# Patient Record
Sex: MALE | Race: OTHER | Employment: OTHER | URBAN - NONMETROPOLITAN AREA
[De-identification: names, ages, dates, MRNs, and addresses within clinical notes are randomized per-mention and may not be internally consistent; named-entity substitution may affect disease eponyms.]

---

## 2017-08-25 ENCOUNTER — HOSPITAL ENCOUNTER (EMERGENCY)
Age: 67
Discharge: HOME | End: 2017-08-25
Attending: EMERGENCY MEDICINE
Payer: MEDICARE

## 2017-08-25 VITALS
HEIGHT: 69 IN | BODY MASS INDEX: 27.99 KG/M2 | DIASTOLIC BLOOD PRESSURE: 91 MMHG | OXYGEN SATURATION: 96 % | SYSTOLIC BLOOD PRESSURE: 151 MMHG | RESPIRATION RATE: 16 BRPM | HEART RATE: 75 BPM | TEMPERATURE: 96.9 F | WEIGHT: 189 LBS

## 2017-08-25 DIAGNOSIS — J01.00 ACUTE MAXILLARY SINUSITIS, RECURRENCE NOT SPECIFIED: Primary | ICD-10-CM

## 2017-08-25 PROCEDURE — 99282 EMERGENCY DEPT VISIT SF MDM: CPT

## 2017-08-25 RX ORDER — AMOXICILLIN 500 MG/1
500 CAPSULE ORAL 3 TIMES DAILY
Qty: 30 CAPSULE | Refills: 0 | Status: SHIPPED | OUTPATIENT
Start: 2017-08-25 | End: 2017-09-04

## 2017-08-25 RX ORDER — METHYLPREDNISOLONE 4 MG/1
TABLET ORAL
Qty: 1 KIT | Refills: 0 | Status: SHIPPED | OUTPATIENT
Start: 2017-08-25 | End: 2017-08-31

## 2017-08-25 RX ORDER — DILTIAZEM HYDROCHLORIDE 120 MG/1
120 TABLET, FILM COATED ORAL DAILY
COMMUNITY

## 2017-08-25 RX ORDER — FERROUS SULFATE 325(65) MG
325 TABLET ORAL 2 TIMES DAILY
COMMUNITY

## 2017-08-25 RX ORDER — HYDROCODONE BITARTRATE AND ACETAMINOPHEN 7.5; 325 MG/1; MG/1
1 TABLET ORAL EVERY 6 HOURS PRN
Qty: 12 TABLET | Refills: 0 | Status: SHIPPED | OUTPATIENT
Start: 2017-08-25 | End: 2017-09-01

## 2017-08-25 ASSESSMENT — PAIN DESCRIPTION - DESCRIPTORS: DESCRIPTORS: CONSTANT;THROBBING

## 2017-08-25 ASSESSMENT — PAIN DESCRIPTION - LOCATION: LOCATION: OTHER (COMMENT)

## 2017-08-25 ASSESSMENT — PAIN SCALES - GENERAL: PAINLEVEL_OUTOF10: 6

## 2017-08-25 ASSESSMENT — PAIN DESCRIPTION - PAIN TYPE: TYPE: ACUTE PAIN

## 2017-08-26 ASSESSMENT — ENCOUNTER SYMPTOMS
NAUSEA: 0
ABDOMINAL PAIN: 0
COUGH: 0
SORE THROAT: 1
SHORTNESS OF BREATH: 0
WHEEZING: 0

## 2020-04-19 ENCOUNTER — HOSPITAL ENCOUNTER (EMERGENCY)
Age: 70
Discharge: HOME | End: 2020-04-19
Attending: EMERGENCY MEDICINE
Payer: MEDICARE

## 2020-04-19 ENCOUNTER — APPOINTMENT (OUTPATIENT)
Dept: CT IMAGING | Age: 70
End: 2020-04-19
Payer: MEDICARE

## 2020-04-19 VITALS
HEIGHT: 69 IN | HEART RATE: 69 BPM | RESPIRATION RATE: 18 BRPM | WEIGHT: 200 LBS | OXYGEN SATURATION: 96 % | BODY MASS INDEX: 29.62 KG/M2 | SYSTOLIC BLOOD PRESSURE: 125 MMHG | DIASTOLIC BLOOD PRESSURE: 87 MMHG | TEMPERATURE: 98.8 F

## 2020-04-19 PROCEDURE — 6370000000 HC RX 637 (ALT 250 FOR IP): Performed by: EMERGENCY MEDICINE

## 2020-04-19 PROCEDURE — 70450 CT HEAD/BRAIN W/O DYE: CPT

## 2020-04-19 PROCEDURE — 99283 EMERGENCY DEPT VISIT LOW MDM: CPT

## 2020-04-19 RX ORDER — ACETAMINOPHEN 500 MG
1000 TABLET ORAL ONCE
Status: COMPLETED | OUTPATIENT
Start: 2020-04-19 | End: 2020-04-19

## 2020-04-19 RX ADMIN — ACETAMINOPHEN 1000 MG: 500 TABLET ORAL at 16:58

## 2020-04-19 ASSESSMENT — PAIN SCALES - GENERAL
PAINLEVEL_OUTOF10: 6
PAINLEVEL_OUTOF10: 4

## 2020-04-19 ASSESSMENT — ENCOUNTER SYMPTOMS
ABDOMINAL PAIN: 0
SORE THROAT: 0
WHEEZING: 0
BLOOD IN STOOL: 0
DIARRHEA: 0
SHORTNESS OF BREATH: 0

## 2020-04-19 ASSESSMENT — PAIN DESCRIPTION - LOCATION: LOCATION: HEAD

## 2020-04-19 NOTE — ED NOTES
Pt presents per self. Complains of 3 weeks sinus headaches. States had antibiotic and Flonase called in by primary care with no relief. States his headache develops about mid day. Takes a nap with some relief and it comes back in evening. Denies fever, cough or nasal congestion. States usually it goes away by now.        Kenroy Grissom RN  04/19/20 4827

## 2020-09-12 ENCOUNTER — HOSPITAL ENCOUNTER (EMERGENCY)
Age: 70
Discharge: HOME | End: 2020-09-12
Attending: FAMILY MEDICINE
Payer: MEDICARE

## 2020-09-12 ENCOUNTER — APPOINTMENT (OUTPATIENT)
Dept: GENERAL RADIOLOGY | Age: 70
End: 2020-09-12
Payer: MEDICARE

## 2020-09-12 VITALS
TEMPERATURE: 97.1 F | RESPIRATION RATE: 16 BRPM | DIASTOLIC BLOOD PRESSURE: 72 MMHG | HEART RATE: 86 BPM | SYSTOLIC BLOOD PRESSURE: 118 MMHG | OXYGEN SATURATION: 97 %

## 2020-09-12 PROCEDURE — 99283 EMERGENCY DEPT VISIT LOW MDM: CPT

## 2020-09-12 PROCEDURE — 73610 X-RAY EXAM OF ANKLE: CPT

## 2020-09-12 PROCEDURE — 99284 EMERGENCY DEPT VISIT MOD MDM: CPT

## 2020-09-12 RX ORDER — NAPROXEN 375 MG/1
375 TABLET ORAL 2 TIMES DAILY WITH MEALS
Qty: 30 TABLET | Refills: 5 | Status: SHIPPED | OUTPATIENT
Start: 2020-09-12 | End: 2022-08-08

## 2020-09-12 ASSESSMENT — ENCOUNTER SYMPTOMS
COUGH: 0
DIARRHEA: 0
BACK PAIN: 0
WHEEZING: 0
SHORTNESS OF BREATH: 0
SORE THROAT: 0
ABDOMINAL PAIN: 0
NAUSEA: 0
VOMITING: 0

## 2020-09-12 ASSESSMENT — PAIN DESCRIPTION - ORIENTATION: ORIENTATION: RIGHT

## 2020-09-12 ASSESSMENT — PAIN DESCRIPTION - PAIN TYPE: TYPE: ACUTE PAIN

## 2020-09-12 ASSESSMENT — PAIN SCALES - GENERAL: PAINLEVEL_OUTOF10: 3

## 2020-09-12 ASSESSMENT — PAIN DESCRIPTION - LOCATION: LOCATION: ANKLE

## 2020-09-12 NOTE — ED PROVIDER NOTES
[address]/[facility] COMPLAINT       Chief Complaint   Patient presents with    Ankle Pain     right       Nurses Notes reviewed and I agree except as noted in the HPI. HISTORY OF PRESENT ILLNESS    Juan Reagan is a 78 y.o. male who presents for evaluation of lateral right ankle pain. Patient was playing weeds just prior to arrival when he tripped over a rock and rolled his right ankle. There is some swelling to the lateral aspect. He rates his pain at a 3/10 severity. Pain does not radiate. He denies any numbness or tingling. He sustained no other injuries. Bearing weight worsens his pain. He took no medication prior to arrival.    REVIEW OF SYSTEMS     Review of Systems   Constitutional: Negative for activity change, appetite change, chills and fever. HENT: Negative for ear pain and sore throat. Respiratory: Negative for cough, shortness of breath and wheezing. Cardiovascular: Negative for chest pain and leg swelling. Gastrointestinal: Negative for abdominal pain, diarrhea, nausea and vomiting. Genitourinary: Negative for dysuria, flank pain and hematuria. Musculoskeletal: Positive for arthralgias and joint swelling. Negative for back pain, gait problem and neck pain. Skin: Negative for rash and wound. Neurological: Negative for weakness, light-headedness and headaches. Psychiatric/Behavioral: Negative for agitation and hallucinations. The patient is not nervous/anxious. PAST MEDICAL HISTORY    has a past medical history of Hypertension. SURGICAL HISTORY      has a past surgical history that includes Nerve Surgery. CURRENT MEDICATIONS       Previous Medications    BLOOD PRESSURE    by Does not apply route.  Patient takes medication for hyperstension     DILTIAZEM (CARDIZEM) 120 MG TABLET    Take 120 mg by mouth daily    FERROUS SULFATE 325 (65 FE) MG TABLET    Take 325 mg by mouth 2 times daily METOPROLOL TARTRATE (LOPRESSOR) 25 MG TABLET    Take 25 mg by mouth 2 times daily Patient unsure of the exact dosage. Thinks it may be 5mg    OMEGA-3 FATTY ACIDS (OMEGA 3 PO)    Take by mouth       ALLERGIES     has No Known Allergies. FAMILY HISTORY     has no family status information on file. family history is not on file. SOCIAL HISTORY      reports that he has quit smoking. He has never used smokeless tobacco. He reports current alcohol use. He reports that he does not use drugs. PHYSICAL EXAM     INITIAL VITALS:  temporal temperature is 97.1 °F (36.2 °C). His blood pressure is 118/72 and his pulse is 86. His respiration is 16 and oxygen saturation is 97%. Physical Exam  Vitals signs and nursing note reviewed. Constitutional:       General: He is not in acute distress. Appearance: He is not diaphoretic. Cardiovascular:      Rate and Rhythm: Normal rate and regular rhythm. Pulses: Normal pulses. Heart sounds: Normal heart sounds. Pulmonary:      Effort: Pulmonary effort is normal.      Breath sounds: Normal breath sounds. Abdominal:      General: Bowel sounds are normal. There is no distension. Palpations: Abdomen is soft. Tenderness: There is no abdominal tenderness. Musculoskeletal: Normal range of motion. General: Swelling (About the right lateral malleolus) and tenderness (Right lateral malleolus and anterior to it) present. Skin:     General: Skin is warm and dry. DIFFERENTIAL DIAGNOSIS:   Right ankle sprain, fracture, dislocation    DIAGNOSTIC RESULTS         RADIOLOGY: non-plain filmimages(s) such as CT, Ultrasound and MRI are read by the radiologist.  XR ANKLE RIGHT (MIN 3 VIEWS)   Final Result   Soft tissue swelling with no acute fracture or dislocation. **This report has been created using voice recognition software. It may contain minor errors which are inherent in voice recognition technology. **      Final report

## 2020-09-12 NOTE — FLOWSHEET NOTE
Air cast applied to right ankle. CNS intact prior to and after application. Pt pink warm and dry, breathing with ease. Pt remains alert and oriented. Prescription given and reviewed. AVS reviewed. Teach back method used. Denies questions or concerns. Pt discharged in stable condition.

## 2020-09-12 NOTE — ED TRIAGE NOTES
Pt presents with complaints of right ankle pain. Pt was spraying weeds and tripped over a rock and rolled his right ankle. Pt right ankle is swollen. Pt alert and oriented.

## 2022-08-08 ENCOUNTER — HOSPITAL ENCOUNTER (EMERGENCY)
Age: 72
Discharge: HOME | End: 2022-08-08
Attending: EMERGENCY MEDICINE
Payer: MEDICARE

## 2022-08-08 VITALS
HEIGHT: 69 IN | HEART RATE: 66 BPM | DIASTOLIC BLOOD PRESSURE: 68 MMHG | WEIGHT: 200 LBS | TEMPERATURE: 97.4 F | SYSTOLIC BLOOD PRESSURE: 108 MMHG | OXYGEN SATURATION: 96 % | RESPIRATION RATE: 18 BRPM | BODY MASS INDEX: 29.62 KG/M2

## 2022-08-08 DIAGNOSIS — J06.9 VIRAL URI: ICD-10-CM

## 2022-08-08 DIAGNOSIS — R09.81 NASAL SINUS CONGESTION: Primary | ICD-10-CM

## 2022-08-08 LAB — SARS-COV-2, NAAT: NOT  DETECTED

## 2022-08-08 PROCEDURE — 87635 SARS-COV-2 COVID-19 AMP PRB: CPT

## 2022-08-08 PROCEDURE — 99283 EMERGENCY DEPT VISIT LOW MDM: CPT

## 2022-08-08 RX ORDER — FLUTICASONE PROPIONATE 50 MCG
1 SPRAY, SUSPENSION (ML) NASAL DAILY
Qty: 16 G | Refills: 0 | Status: SHIPPED | OUTPATIENT
Start: 2022-08-08

## 2022-08-08 RX ORDER — CETIRIZINE HYDROCHLORIDE 10 MG/1
10 TABLET ORAL DAILY
Qty: 10 TABLET | Refills: 0 | Status: SHIPPED | OUTPATIENT
Start: 2022-08-08 | End: 2022-08-18

## 2022-08-08 ASSESSMENT — PAIN - FUNCTIONAL ASSESSMENT: PAIN_FUNCTIONAL_ASSESSMENT: NONE - DENIES PAIN

## 2022-08-08 NOTE — ED NOTES
Pt resting, resp even and unlabored, skin pink, warm and dry. Pt given discharge instructions and verbalizes understanding, pt released.      Alysia Hayes RN  08/08/22 4094

## 2022-08-08 NOTE — ED PROVIDER NOTES
[address]  [address] [place] [address] [address]  Phone: [phone]    eMERGENCY dEPARTMENT eNCOUnter           CHIEF COMPLAINT       Chief Complaint   Patient presents with    Sinusitis       Nurses Notes reviewed and I agree except as noted in the HPI. HISTORY OF PRESENT ILLNESS    Rainer Salter is a 66 y.o. male who presented via private vehicle with above-mentioned complaints. He presented with 2 days history of nasal congestion and headache. He describes his headache as mild persistent pressure over the central frontal head and nasal sinuses. He denies nasal discharge. He had a chronic sinus problem due to \"allergies\". He denies fever or chills. He denies nausea or vomiting. He denies difficulty breathing or swallowing. REVIEW OF SYSTEMS     Negative except as mentioned above    PAST MEDICAL HISTORY    has a past medical history of Hypertension. SURGICAL HISTORY      has a past surgical history that includes Nerve Surgery. CURRENT MEDICATIONS       Previous Medications    BLOOD PRESSURE    by Does not apply route. Patient takes medication for hyperstension     DILTIAZEM (CARDIZEM) 120 MG TABLET    Take 120 mg by mouth daily    FERROUS SULFATE 325 (65 FE) MG TABLET    Take 325 mg by mouth 2 times daily    METOPROLOL TARTRATE (LOPRESSOR) 25 MG TABLET    Take 25 mg by mouth 2 times daily Patient unsure of the exact dosage. Thinks it may be 5mg    OMEGA-3 FATTY ACIDS (OMEGA 3 PO)    Take by mouth       ALLERGIES     has No Known Allergies. FAMILY HISTORY     has no family status information on file. family history is not on file. SOCIAL HISTORY      reports that he has quit smoking. He has never used smokeless tobacco. He reports current alcohol use. He reports that he does not use drugs. PHYSICAL EXAM     INITIAL VITALS:  height is 5' 9\" (1.753 m) and weight is 200 lb (90.7 kg). His tympanic temperature is 97.4 °F (36.3 °C).  His blood pressure is 108/68 and his pulse is 66. His respiration is 18 and oxygen saturation is 96%. Physical Exam  Vitals and nursing note reviewed. Constitutional:       General: He is not in acute distress. Appearance: He is well-developed. He is not ill-appearing. HENT:      Head: Atraumatic. Comments: Mild nasal congestion, no pain with percussion of the frontal or maxillary sinuses. Eyes:      Conjunctiva/sclera: Conjunctivae normal.      Pupils: Pupils are equal, round, and reactive to light. Neck:      Thyroid: No thyromegaly. Vascular: No JVD. Trachea: No tracheal deviation. Cardiovascular:      Rate and Rhythm: Normal rate and regular rhythm. Heart sounds: No murmur heard. No friction rub. No gallop. Pulmonary:      Effort: Pulmonary effort is normal.      Breath sounds: Normal breath sounds. Musculoskeletal:         General: No tenderness. Cervical back: Neck supple. Neurological:      Mental Status: He is alert. DIFFERENTIAL DIAGNOSIS:       DIAGNOSTIC RESULTS         LABS:   Labs Reviewed   COVID-19, RAPID       EMERGENCY DEPARTMENT COURSE:   Vitals:    Vitals:    08/08/22 1257   BP: 108/68   Pulse: 66   Resp: 18   Temp: 97.4 °F (36.3 °C)   TempSrc: Tympanic   SpO2: 96%   Weight: 200 lb (90.7 kg)   Height: 5' 9\" (1.753 m)         FINAL IMPRESSION      1. Nasal sinus congestion    2. Viral URI          DISPOSITION/PLAN   Discharged  home in good condition. PATIENT REFERRED TO:  King Lozano MD  [address] [place] [address] [address] [id number] [phone]    In 2 days        DISCHARGE MEDICATIONS:  New Prescriptions    CETIRIZINE (ZYRTEC) 10 MG TABLET    Take 1 tablet by mouth in the morning for 10 days. FLUTICASONE (FLONASE) 50 MCG/ACT NASAL SPRAY    1 spray by Each Nostril route in the morning.        (Please note that portions of this note were completed with a voice recognition program.  Efforts were made to edit the dictations but occasionally words are mis-transcribed.)    MD Oksana Guerin MD  08/08/22 5525

## 2022-08-08 NOTE — ED NOTES
Pt complains of sinus pain, headache and irritated eyes. Pt denies fever, vomiting or diarrhea. Pt alert, resp even and unlabored, skin pink, warm and dry.      Irma Couch RN  08/08/22 2020

## 2022-08-19 ENCOUNTER — APPOINTMENT (OUTPATIENT)
Dept: GENERAL RADIOLOGY | Age: 72
End: 2022-08-19
Payer: MEDICARE

## 2022-08-19 ENCOUNTER — HOSPITAL ENCOUNTER (EMERGENCY)
Age: 72
Discharge: HOME | End: 2022-08-19
Attending: FAMILY MEDICINE
Payer: MEDICARE

## 2022-08-19 VITALS
BODY MASS INDEX: 29.62 KG/M2 | WEIGHT: 200 LBS | DIASTOLIC BLOOD PRESSURE: 72 MMHG | HEART RATE: 61 BPM | TEMPERATURE: 97.3 F | SYSTOLIC BLOOD PRESSURE: 114 MMHG | RESPIRATION RATE: 16 BRPM | OXYGEN SATURATION: 95 % | HEIGHT: 69 IN

## 2022-08-19 DIAGNOSIS — D69.6 THROMBOCYTOPENIA (HCC): ICD-10-CM

## 2022-08-19 DIAGNOSIS — R06.09 DYSPNEA ON EXERTION: Primary | ICD-10-CM

## 2022-08-19 LAB
ALBUMIN SERPL-MCNC: 3.3 GM/DL (ref 3.4–5)
ALP BLD-CCNC: 113 U/L (ref 46–116)
ALT SERPL-CCNC: 38 U/L (ref 14–63)
ANION GAP: 9 MEQ/L (ref 8–16)
AST SERPL-CCNC: 43 U/L (ref 15–37)
BILIRUB SERPL-MCNC: 0.8 MG/DL (ref 0.2–1)
BUN BLDV-MCNC: 12 MG/DL (ref 7–18)
CHLORIDE BLD-SCNC: 105 MEQ/L (ref 98–107)
CO2: 23 MEQ/L (ref 21–32)
CREAT SERPL-MCNC: 1 MG/DL (ref 0.6–1.3)
DIFFERENTIAL, MANUAL: NORMAL
GFR, ESTIMATED: 78 ML/MIN/1.73M2
GLUCOSE BLD-MCNC: 126 MG/DL (ref 74–106)
MAGNESIUM: 1.7 MG/DL (ref 1.8–2.4)
NT PRO BNP: 91 PG/ML (ref 0–900)
POC CALCIUM: 8.5 MG/DL (ref 8.5–10.1)
POTASSIUM SERPL-SCNC: 3.8 MEQ/L (ref 3.5–5.1)
SODIUM BLD-SCNC: 137 MEQ/L (ref 136–145)
TOTAL PROTEIN: 7 GM/DL (ref 6.4–8.2)
TROPONIN, HIGH SENSITIVITY: 17.8 PG/ML (ref 0–76.1)

## 2022-08-19 PROCEDURE — 93005 ELECTROCARDIOGRAM TRACING: CPT | Performed by: FAMILY MEDICINE

## 2022-08-19 PROCEDURE — 80053 COMPREHEN METABOLIC PANEL: CPT

## 2022-08-19 PROCEDURE — 85025 COMPLETE CBC W/AUTO DIFF WBC: CPT

## 2022-08-19 PROCEDURE — 99285 EMERGENCY DEPT VISIT HI MDM: CPT

## 2022-08-19 PROCEDURE — 83880 ASSAY OF NATRIURETIC PEPTIDE: CPT

## 2022-08-19 PROCEDURE — 83735 ASSAY OF MAGNESIUM: CPT

## 2022-08-19 PROCEDURE — 71046 X-RAY EXAM CHEST 2 VIEWS: CPT

## 2022-08-19 PROCEDURE — 84484 ASSAY OF TROPONIN QUANT: CPT

## 2022-08-19 RX ORDER — CETIRIZINE HYDROCHLORIDE 10 MG/1
10 TABLET ORAL NIGHTLY
COMMUNITY
Start: 2022-08-08

## 2022-08-19 ASSESSMENT — ENCOUNTER SYMPTOMS
CONSTIPATION: 0
SHORTNESS OF BREATH: 1
WHEEZING: 0
DIARRHEA: 0
ABDOMINAL PAIN: 0
SORE THROAT: 0
VOMITING: 0
COLOR CHANGE: 0
COUGH: 0
NAUSEA: 0
BACK PAIN: 0

## 2022-08-19 ASSESSMENT — PAIN - FUNCTIONAL ASSESSMENT
PAIN_FUNCTIONAL_ASSESSMENT: NONE - DENIES PAIN

## 2022-08-19 NOTE — ED PROVIDER NOTES
surgical history that includes Nerve Surgery. CURRENT MEDICATIONS       Previous Medications    BLOOD PRESSURE    by Does not apply route. Patient takes medication for hyperstension     CETIRIZINE (ZYRTEC) 10 MG TABLET    Take 10 mg by mouth at bedtime    DILTIAZEM (CARDIZEM) 120 MG TABLET    Take 120 mg by mouth daily    FERROUS SULFATE 325 (65 FE) MG TABLET    Take 325 mg by mouth 2 times daily    FLUTICASONE (FLONASE) 50 MCG/ACT NASAL SPRAY    1 spray by Each Nostril route in the morning. METOPROLOL TARTRATE (LOPRESSOR) 25 MG TABLET    Take 25 mg by mouth 2 times daily Patient unsure of the exact dosage. Thinks it may be 5mg    OMEGA-3 FATTY ACIDS (OMEGA 3 PO)    Take by mouth       ALLERGIES     has No Known Allergies. FAMILY HISTORY     has no family status information on file. family history is not on file. SOCIAL HISTORY      reports that he has quit smoking. He has never used smokeless tobacco. He reports current alcohol use. He reports that he does not use drugs. PHYSICAL EXAM     INITIAL VITALS:  height is 5' 9\" (1.753 m) and weight is 200 lb (90.7 kg). His temporal temperature is 97.3 °F (36.3 °C). His blood pressure is 114/72 and his pulse is 61. His respiration is 16 and oxygen saturation is 95%. Physical Exam  Vitals and nursing note reviewed. Constitutional:       General: He is not in acute distress. Appearance: He is not diaphoretic. HENT:      Head: Normocephalic and atraumatic. Cardiovascular:      Rate and Rhythm: Normal rate and regular rhythm. Heart sounds: Normal heart sounds. Pulmonary:      Effort: Pulmonary effort is normal.      Breath sounds: Normal breath sounds. Abdominal:      General: Bowel sounds are normal. There is no distension. Palpations: Abdomen is soft. Tenderness: There is no abdominal tenderness. Musculoskeletal:         General: Normal range of motion. Right lower leg: Edema (+1 pitting) present.       Left lower leg: Edema (+1 pitting) present. Lymphadenopathy:      Cervical: No cervical adenopathy. Skin:     General: Skin is warm and dry. Neurological:      Mental Status: He is alert and oriented to person, place, and time. Psychiatric:         Mood and Affect: Mood normal.         Behavior: Behavior normal.       DIFFERENTIAL DIAGNOSIS:   CHF, pneumonia, ACS,     DIAGNOSTIC RESULTS     EKG: All EKG's are interpreted by the Emergency Department Physician whoeither signs or Co-signs this chart in the absence of a cardiologist.  Normal sinus rhythm with heart rate 66. AL interval 196. QRS duration 90. QTc 429. Axis -28. ECG performed at [facility] in [place] yesterday 8/18/2022. Sinus bradycardia with heart rate 49. AL interval 190. QRS duration 88. QTc 435. Axis -30. RADIOLOGY: non-plain filmimages(s) such as CT, Ultrasound and MRI are read by the radiologist.  XR CHEST (2 VW)   Final Result   No acute cardiopulmonary disease. **This report has been created using voice recognition software. It may contain minor errors which are inherent in voice recognition technology. **      Final report electronically signed by Dr. Maisha Whitten on 8/19/2022 4:26 PM            LABS:   Labs Reviewed   COMPREHENSIVE METABOLIC PANEL - Abnormal; Notable for the following components:       Result Value    Glucose 126 (*)     AST 43 (*)     Albumin 3.3 (*)     All other components within normal limits   MAGNESIUM - Abnormal; Notable for the following components:    Magnesium 1.7 (*)     All other components within normal limits   GLOMERULAR FILTRATION RATE, ESTIMATED - Abnormal; Notable for the following components:    GFR, Estimated 78 (*)     All other components within normal limits   TROPONIN   BRAIN NATRIURETIC PEPTIDE   ANION GAP   CBC WITH AUTO DIFFERENTIAL       DEPARTMENT COURSE:   Vitals:    Vitals:    08/19/22 1555 08/19/22 1724 08/19/22 1727   BP: 108/68 107/60 114/72   Pulse: 66 60 61   Resp: 16 16 Temp: 97.3 °F (36.3 °C)     TempSrc: Temporal     SpO2: 97% 97% 95%   Weight: 200 lb (90.7 kg)     Height: 5' 9\" (1.753 m)         MDM:  Patient presents for evaluation of shortness of breath with exertion. Appropriate work-up was initiated. Patient was not found to be bradycardic today but patient is on metoprolol which may have led to decreased rate noted yesterday. Patient does have mild pitting edema noted in the bilateral lower extremities but proBNP is not elevated. Troponin is not elevated. Patient is noted to have thrombocytopenia and leukopenia. Patient did report viral illness symptoms a couple of weeks ago including sore throat and nasal congestion. Recommended that these labs be repeated by his new PCP. Patient has not seen a PCP in several years. He is recommend to establish care here locally or with someone at the [place]. He will return to department for any symptom worsening or for evaluation of new concerning symptoms. CRITICAL CARE:   None    CONSULTS:  None    PROCEDURES:  None    FINAL IMPRESSION      1. Dyspnea on exertion    2.  Thrombocytopenia [facility])          DISPOSITION/PLAN   Discharge    PATIENT REFERRED TO:  Lida Duffy MD  [facility]  [address]  [address] [place] [address]  [phone]    Schedule an appointment as soon as possible for a visit in 1 week  ED visit follow up      [address] [place] [address]:  New Prescriptions    No medications on file       (Please note that portions of this note were completedwith a voice recognition program.  Efforts were made to edit the dictations but occasionally words are mis-transcribed.)    MD Raya Pink MD  08/19/22 6333

## 2022-08-19 NOTE — ED TRIAGE NOTES
Arrives to [facility] for the evaluation of SOB with exertion. 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 Patient was seen yesterday at the [facility]. Had an EKG completed that read SB rate 49. EKG today read NSR. Placed on telemetry. Denies chest pain, fatigue, fever, chills, cough, sore throat, dizziness, nausea, vomiting. Does take Metoprolol and Cardizem at home. Resting on cot at this time. Waiting provider to assess. Call light in reach.

## 2022-08-21 LAB
ANISOCYTOSIS: SLIGHT
ATYPICAL LYMPHOCYTES: ABNORMAL %
BASOPHILS # BLD: 1 % (ref 0–3)
DIFFERENTIAL, AUTO: ABNORMAL
EKG ATRIAL RATE: 66 BPM
EKG P AXIS: 26 DEGREES
EKG P-R INTERVAL: 196 MS
EKG Q-T INTERVAL: 410 MS
EKG QRS DURATION: 90 MS
EKG QTC CALCULATION (BAZETT): 429 MS
EKG R AXIS: -28 DEGREES
EKG T AXIS: 15 DEGREES
EKG VENTRICULAR RATE: 66 BPM
EOSINOPHILS RELATIVE PERCENT: 2 % (ref 0–4)
HCT VFR BLD CALC: 42.5 % (ref 42–52)
HEMOGLOBIN: 14.1 GM/DL (ref 14–18)
HYPOCHROMIA: SLIGHT
LYMPHOCYTES # BLD: 43 % (ref 15–47)
MCH RBC QN AUTO: 29 PG (ref 27–31)
MCHC RBC AUTO-ENTMCNC: 33.2 GM/DL (ref 33–37)
MCV RBC AUTO: 87 FL (ref 80–94)
MONOCYTES: 7 % (ref 0–12)
PATHOLOGIST REVIEW: ABNORMAL
PDW BLD-RTO: 12.7 % (ref 11.5–14.5)
PLATELET # BLD: 85 THOU/MM3 (ref 130–400)
PLATELET ESTIMATE: ABNORMAL
PMV BLD AUTO: 9.8 FL (ref 7.4–10.4)
RBC # BLD: 4.87 MILL/MM3 (ref 4.7–6.1)
SEGS: 47 % (ref 43–75)
WBC # BLD: 4 THOU/MM3 (ref 4.8–10.8)

## 2022-08-21 PROCEDURE — 93010 ELECTROCARDIOGRAM REPORT: CPT | Performed by: INTERNAL MEDICINE

## 2023-08-17 ENCOUNTER — HOSPITAL ENCOUNTER (OUTPATIENT)
Age: 73
Discharge: HOME | End: 2023-08-17
Payer: OTHER GOVERNMENT

## 2023-08-17 LAB — INR PPP: 1.09 (ref 0.85–1.13)

## 2023-08-17 PROCEDURE — 82105 ALPHA-FETOPROTEIN SERUM: CPT

## 2023-08-17 PROCEDURE — 36415 COLL VENOUS BLD VENIPUNCTURE: CPT

## 2023-08-17 PROCEDURE — 85610 PROTHROMBIN TIME: CPT

## 2023-08-18 LAB — AFP SERPL-MCNC: 15.2 UG/L

## 2024-02-27 ENCOUNTER — HOSPITAL ENCOUNTER (EMERGENCY)
Age: 74
Discharge: HOME | End: 2024-02-27
Attending: EMERGENCY MEDICINE
Payer: OTHER GOVERNMENT

## 2024-02-27 ENCOUNTER — APPOINTMENT (OUTPATIENT)
Dept: CT IMAGING | Age: 74
End: 2024-02-27
Payer: OTHER GOVERNMENT

## 2024-02-27 VITALS
BODY MASS INDEX: 28.63 KG/M2 | HEART RATE: 96 BPM | HEIGHT: 70 IN | WEIGHT: 200 LBS | SYSTOLIC BLOOD PRESSURE: 94 MMHG | TEMPERATURE: 98.7 F | RESPIRATION RATE: 16 BRPM | OXYGEN SATURATION: 96 % | DIASTOLIC BLOOD PRESSURE: 69 MMHG

## 2024-02-27 DIAGNOSIS — R51.9 ACUTE NONINTRACTABLE HEADACHE, UNSPECIFIED HEADACHE TYPE: Primary | ICD-10-CM

## 2024-02-27 LAB
INFLUENZA A BY PCR: NOT DETECTED
INFLUENZA B BY PCR: NOT DETECTED
SARS-COV-2 RNA, RT PCR: NOT DETECTED

## 2024-02-27 PROCEDURE — 70450 CT HEAD/BRAIN W/O DYE: CPT

## 2024-02-27 PROCEDURE — 99284 EMERGENCY DEPT VISIT MOD MDM: CPT

## 2024-02-27 PROCEDURE — 87636 SARSCOV2 & INF A&B AMP PRB: CPT

## 2024-02-27 RX ORDER — ATORVASTATIN CALCIUM 20 MG/1
TABLET, FILM COATED ORAL
COMMUNITY
Start: 2020-11-04

## 2024-02-27 RX ORDER — TRAMADOL HYDROCHLORIDE 50 MG/1
50 TABLET ORAL EVERY 6 HOURS PRN
Qty: 12 TABLET | Refills: 0 | Status: SHIPPED | OUTPATIENT
Start: 2024-02-27 | End: 2024-03-01

## 2024-02-27 RX ORDER — OMEPRAZOLE 20 MG/1
20 CAPSULE, DELAYED RELEASE ORAL DAILY
COMMUNITY

## 2024-02-27 ASSESSMENT — PAIN - FUNCTIONAL ASSESSMENT
PAIN_FUNCTIONAL_ASSESSMENT: 0-10
PAIN_FUNCTIONAL_ASSESSMENT: 0-10

## 2024-02-27 ASSESSMENT — PAIN DESCRIPTION - ORIENTATION: ORIENTATION: ANTERIOR

## 2024-02-27 ASSESSMENT — LIFESTYLE VARIABLES: HOW OFTEN DO YOU HAVE A DRINK CONTAINING ALCOHOL: NEVER

## 2024-02-27 ASSESSMENT — PAIN SCALES - GENERAL
PAINLEVEL_OUTOF10: 5
PAINLEVEL_OUTOF10: 8

## 2024-02-27 ASSESSMENT — PAIN DESCRIPTION - LOCATION
LOCATION: HEAD
LOCATION: HEAD

## 2024-02-27 NOTE — ED NOTES
Pt alert and oriented. Respirations regular and easy. Prescriptions sent to pharmacy and pt instructed on. Discharge instructions reviewed. States understanding. Pt discharged in satisfactory condition.

## 2024-02-27 NOTE — ED NOTES
Pt presents w/ 1 week c/o headache/ pressure around his eyes, tightness in his chest w/ deep breathing, fatigue, achiness and chills at night. Respirations regular and easy. No distress noted.

## 2024-02-27 NOTE — ED PROVIDER NOTES
environmental issues.  He may use tramadol for severe pain.  He is worried about his liver    Disposition discussion with patient/family:  Patient only    Case discussed with a consulting clinician:  No    Social determinants of health impacting treatment or disposition:  Not applicable    Shared Decision Making:  Not applicable    Code Status Discussion:  Not applicable      FINAL  REASSESSMENT   1:22 PM     Discharged home in good condition    CRITICAL CARE TIME   None    PROCEDURES:  None  Procedures     FINAL IMPRESSION      1. Acute nonintractable headache, unspecified headache type          DISPOSITION/PLAN     DISPOSITION Decision To Discharge 02/27/2024 01:18:24 PM      PATIENT REFERRED TO:  Leopold, Katelyn Ann, MD  [address]   [facility] [address]  [phone]    Call   Follow up from ER condition      DISCHARGE MEDICATIONS:  New Prescriptions    TRAMADOL (ULTRAM) 50 MG TABLET    Take 1 tablet by mouth every 6 hours as needed for Pain for up to 3 days. Intended supply: 3 days. Take lowest dose possible to manage pain Max Daily Amount: 200 mg       (Please note that portions of this note were completed with a voice recognition program.  Efforts were made to edit the dictations but occasionally words are mis-transcribed.)    Vitor Hayes MD (electronically signed)  Attending Emergency Physician                      Vitor Hayes MD  02/27/24 8639

## 2024-02-27 NOTE — DISCHARGE INSTRUCTIONS
Try Motrin or Aleve for headache.  May take tramadol for severe pain.  Do not drive or operate machinery  while taking tramadol.  Call your primary care doctor for further pain management or other issues.

## 2024-04-12 ENCOUNTER — HOSPITAL ENCOUNTER (OUTPATIENT)
Dept: MRI IMAGING | Age: 74
Discharge: HOME | End: 2024-04-12
Attending: RADIOLOGY

## 2024-04-12 ENCOUNTER — HOSPITAL ENCOUNTER (OUTPATIENT)
Dept: CT IMAGING | Age: 74
Discharge: HOME | End: 2024-04-12
Attending: RADIOLOGY

## 2024-04-12 ENCOUNTER — HOSPITAL ENCOUNTER (OUTPATIENT)
Dept: ULTRASOUND IMAGING | Age: 74
Discharge: HOME | End: 2024-04-12
Attending: RADIOLOGY

## 2024-04-12 DIAGNOSIS — Z00.6 EXAMINATION FOR NORMAL COMPARISON FOR CLINICAL RESEARCH: ICD-10-CM

## 2024-04-19 ENCOUNTER — OFFICE VISIT (OUTPATIENT)
Dept: ONCOLOGY | Age: 74
End: 2024-04-19
Payer: OTHER GOVERNMENT

## 2024-04-19 ENCOUNTER — HOSPITAL ENCOUNTER (OUTPATIENT)
Dept: INFUSION THERAPY | Age: 74
Discharge: HOME | End: 2024-04-19
Payer: OTHER GOVERNMENT

## 2024-04-19 ENCOUNTER — CLINICAL DOCUMENTATION (OUTPATIENT)
Dept: CASE MANAGEMENT | Age: 74
End: 2024-04-19

## 2024-04-19 VITALS
HEART RATE: 70 BPM | SYSTOLIC BLOOD PRESSURE: 120 MMHG | OXYGEN SATURATION: 96 % | HEIGHT: 70 IN | RESPIRATION RATE: 16 BRPM | TEMPERATURE: 98 F | WEIGHT: 193 LBS | BODY MASS INDEX: 27.63 KG/M2 | DIASTOLIC BLOOD PRESSURE: 76 MMHG

## 2024-04-19 VITALS
HEART RATE: 70 BPM | DIASTOLIC BLOOD PRESSURE: 76 MMHG | OXYGEN SATURATION: 96 % | RESPIRATION RATE: 16 BRPM | TEMPERATURE: 98 F | SYSTOLIC BLOOD PRESSURE: 120 MMHG

## 2024-04-19 DIAGNOSIS — C22.0 HEPATOCELLULAR CARCINOMA IN ADULT (HCC): ICD-10-CM

## 2024-04-19 DIAGNOSIS — C22.0 HEPATOCELLULAR CARCINOMA IN ADULT (HCC): Primary | ICD-10-CM

## 2024-04-19 LAB
ALBUMIN SERPL BCG-MCNC: 3.9 G/DL (ref 3.5–5.1)
ALP SERPL-CCNC: 238 U/L (ref 38–126)
ALT SERPL W/O P-5'-P-CCNC: 31 U/L (ref 11–66)
ANION GAP SERPL CALC-SCNC: 12 MEQ/L (ref 8–16)
AST SERPL-CCNC: 58 U/L (ref 5–40)
BASOPHILS ABSOLUTE: 0 THOU/MM3 (ref 0–0.1)
BASOPHILS NFR BLD AUTO: 0.5 %
BILIRUB SERPL-MCNC: 1.3 MG/DL (ref 0.3–1.2)
BUN SERPL-MCNC: 13 MG/DL (ref 7–22)
CALCIUM SERPL-MCNC: 9 MG/DL (ref 8.5–10.5)
CHLORIDE SERPL-SCNC: 102 MEQ/L (ref 98–111)
CO2 SERPL-SCNC: 23 MEQ/L (ref 23–33)
CREAT SERPL-MCNC: 0.7 MG/DL (ref 0.4–1.2)
DEPRECATED RDW RBC AUTO: 52.5 FL (ref 35–45)
EOSINOPHIL NFR BLD AUTO: 4.7 %
EOSINOPHILS ABSOLUTE: 0.2 THOU/MM3 (ref 0–0.4)
ERYTHROCYTE [DISTWIDTH] IN BLOOD BY AUTOMATED COUNT: 15 % (ref 11.5–14.5)
GFR SERPL CREATININE-BSD FRML MDRD: > 90 ML/MIN/1.73M2
GLUCOSE SERPL-MCNC: 107 MG/DL (ref 70–108)
HCT VFR BLD AUTO: 46.9 % (ref 42–52)
HGB BLD-MCNC: 15.4 GM/DL (ref 14–18)
IMM GRANULOCYTES # BLD AUTO: 0.02 THOU/MM3 (ref 0–0.07)
IMM GRANULOCYTES NFR BLD AUTO: 0.5 %
LYMPHOCYTES ABSOLUTE: 1.1 THOU/MM3 (ref 1–4.8)
LYMPHOCYTES NFR BLD AUTO: 29.4 %
MCH RBC QN AUTO: 31 PG (ref 26–33)
MCHC RBC AUTO-ENTMCNC: 32.8 GM/DL (ref 32.2–35.5)
MCV RBC AUTO: 94.6 FL (ref 80–94)
MONOCYTES ABSOLUTE: 0.6 THOU/MM3 (ref 0.4–1.3)
MONOCYTES NFR BLD AUTO: 16.8 %
NEUTROPHILS NFR BLD AUTO: 48.1 %
NRBC BLD AUTO-RTO: 0 /100 WBC
PLATELET # BLD AUTO: 89 THOU/MM3 (ref 130–400)
PMV BLD AUTO: 12.2 FL (ref 9.4–12.4)
POTASSIUM SERPL-SCNC: 4.6 MEQ/L (ref 3.5–5.2)
PROT SERPL-MCNC: 7.3 G/DL (ref 6.1–8)
RBC # BLD AUTO: 4.96 MILL/MM3 (ref 4.7–6.1)
SCAN OF BLOOD SMEAR: NORMAL
SEGMENTED NEUTROPHILS ABSOLUTE COUNT: 1.7 THOU/MM3 (ref 1.8–7.7)
SODIUM SERPL-SCNC: 137 MEQ/L (ref 135–145)
WBC # BLD AUTO: 3.6 THOU/MM3 (ref 4.8–10.8)

## 2024-04-19 PROCEDURE — 36415 COLL VENOUS BLD VENIPUNCTURE: CPT

## 2024-04-19 PROCEDURE — 80053 COMPREHEN METABOLIC PANEL: CPT

## 2024-04-19 PROCEDURE — 85025 COMPLETE CBC W/AUTO DIFF WBC: CPT

## 2024-04-19 PROCEDURE — 99204 OFFICE O/P NEW MOD 45 MIN: CPT | Performed by: INTERNAL MEDICINE

## 2024-04-19 PROCEDURE — 82105 ALPHA-FETOPROTEIN SERUM: CPT

## 2024-04-19 PROCEDURE — 1123F ACP DISCUSS/DSCN MKR DOCD: CPT | Performed by: INTERNAL MEDICINE

## 2024-04-19 PROCEDURE — 99211 OFF/OP EST MAY X REQ PHY/QHP: CPT

## 2024-04-19 RX ORDER — CARVEDILOL 6.25 MG/1
3.12 TABLET ORAL DAILY
COMMUNITY

## 2024-04-19 RX ORDER — TAMSULOSIN HYDROCHLORIDE 0.4 MG/1
0.4 CAPSULE ORAL DAILY
COMMUNITY
Start: 2020-11-04

## 2024-04-19 RX ORDER — ACETAMINOPHEN 160 MG
2000 TABLET,DISINTEGRATING ORAL DAILY
COMMUNITY

## 2024-04-19 NOTE — PROGRESS NOTES
[facility] [place] [id number] [facility], [address]  [facility] [address]  Dept: [phone]  Dept Fax: [phone] Loc: [phone]      Visit Date:4/19/2024     Dann Turner is a 73 y.o. male who presents today for:   Chief Complaint   Patient presents with    New Patient     Liver cancer        HPI:   Dann Turner is a 73 y.o. male referred to Hematology/Oncology clinic for evaluation of hepatocellular carcinoma.  The patient's history goes back to 2021 when he was found to have a 6-1/2 cm lesion on MRI consistent with hepatocellular carcinoma in the background of cirrhosis.  He did have a biopsy of the liver in 2020 and this demonstrated cirrhosis but no evidence of malignancy.  He had elevation of his alpha-fetoprotein, was seen at the [facility],and because of this had embolization of the lesion in the liver.  All of his subsequent there treatment has been at the [facility]. He subsequently has had multiple liver directed therapies for progression including Yterrium 90 and trans arterial hepatic embolization (TACE).  Most recently on an MRI in March demonstrated new lesions that were not amenable to local therapy.  Of note the patient does not have histological confirmation of disease and has not had tumor genomic sequencing.  He reports he feels well denies fevers chills nausea and changes in bowel or bladder habits changes in hearing or vision.  He does still drink alcohol.            Past Medical History:   Diagnosis Date    Cancer (HCC)     liver    Hyperlipidemia     Hypertension       Past Surgical History:   Procedure Laterality Date    NERVE SURGERY      pinched nerve in left arm    OTHER SURGICAL HISTORY  02/02/2024    \"chemo embolization\" of liver tumor      Family History   Problem Relation Age of Onset    Stroke Father       Social History     Tobacco Use    Smoking status: Former    Smokeless tobacco: Never   Substance Use Topics    Alcohol use: Yes     Comment: occasional

## 2024-04-19 NOTE — PROGRESS NOTES
Name: Dann Turner  : 1950  MRN: [id number]    Oncology Navigation- Initial Note:    Intake-  Contact Type: Medical Oncology    Diagnosis:  Hepatocellular cancer  Previously seen at [facility], & has undergone 2 ablations.  Pt referred locally for systemic tx.    Home Disposition: Lives with other who is able to assist  -Independent  -Drives  -Retired    Patient needs and barriers to care: Coordination of Care, Knowledge deficit, and Symptom Management     Referral Source: Outpatient    Receptive to Advanced Care Planning/ Palliative Care:  deferred    Interventions-   General Interventions: Marcelo program discussed; welcome folder reviewed.     Education/Screenings:  yes -     ONC POC:  -MRI Liver ASAP  -Labwork today  -Obtain records from [facility]  -ONC will decide on liver BX after reviewing MRI --need tissue dx before proceeding with any systemic tx.  - ONC discussed sending BX tissue for [facility]  -Return 24 to see Dr. Gutierrez     Referrals: Supportive Therapies +     Continuum of Care: Diagnosis/Active Treatment    Notes: Marcelo following to assist as needed.    Electronically signed by Mitra Ram RN on 2024 at 2:14 PM

## 2024-04-20 LAB — AFP SERPL-MCNC: 195 UG/L

## 2024-05-03 ENCOUNTER — HOSPITAL ENCOUNTER (OUTPATIENT)
Dept: MRI IMAGING | Age: 74
Discharge: HOME | End: 2024-05-03
Attending: INTERNAL MEDICINE
Payer: OTHER GOVERNMENT

## 2024-05-03 DIAGNOSIS — C22.0 HEPATOCELLULAR CARCINOMA IN ADULT (HCC): ICD-10-CM

## 2024-05-03 PROCEDURE — 6360000004 HC RX CONTRAST MEDICATION: Performed by: INTERNAL MEDICINE

## 2024-05-03 PROCEDURE — A9579 GAD-BASE MR CONTRAST NOS,1ML: HCPCS | Performed by: INTERNAL MEDICINE

## 2024-05-03 PROCEDURE — 74183 MRI ABD W/O CNTR FLWD CNTR: CPT

## 2024-05-03 RX ADMIN — GADOTERIDOL 20 ML: 279.3 INJECTION, SOLUTION INTRAVENOUS at 13:08

## 2024-05-13 ENCOUNTER — CLINICAL DOCUMENTATION (OUTPATIENT)
Dept: CASE MANAGEMENT | Age: 74
End: 2024-05-13

## 2024-05-13 ENCOUNTER — HOSPITAL ENCOUNTER (OUTPATIENT)
Dept: INFUSION THERAPY | Age: 74
Discharge: HOME | End: 2024-05-13
Payer: OTHER GOVERNMENT

## 2024-05-13 ENCOUNTER — OFFICE VISIT (OUTPATIENT)
Dept: ONCOLOGY | Age: 74
End: 2024-05-13
Payer: OTHER GOVERNMENT

## 2024-05-13 VITALS
TEMPERATURE: 97.6 F | HEIGHT: 70 IN | RESPIRATION RATE: 16 BRPM | SYSTOLIC BLOOD PRESSURE: 109 MMHG | DIASTOLIC BLOOD PRESSURE: 67 MMHG | HEART RATE: 67 BPM | OXYGEN SATURATION: 97 % | WEIGHT: 193.2 LBS | BODY MASS INDEX: 27.66 KG/M2

## 2024-05-13 VITALS
SYSTOLIC BLOOD PRESSURE: 109 MMHG | OXYGEN SATURATION: 97 % | RESPIRATION RATE: 16 BRPM | DIASTOLIC BLOOD PRESSURE: 67 MMHG | HEART RATE: 67 BPM | TEMPERATURE: 97.6 F

## 2024-05-13 DIAGNOSIS — C22.0 HEPATOCELLULAR CARCINOMA IN ADULT (HCC): Primary | ICD-10-CM

## 2024-05-13 PROCEDURE — 99214 OFFICE O/P EST MOD 30 MIN: CPT | Performed by: INTERNAL MEDICINE

## 2024-05-13 PROCEDURE — 1036F TOBACCO NON-USER: CPT | Performed by: INTERNAL MEDICINE

## 2024-05-13 PROCEDURE — 99211 OFF/OP EST MAY X REQ PHY/QHP: CPT

## 2024-05-13 PROCEDURE — G8428 CUR MEDS NOT DOCUMENT: HCPCS | Performed by: INTERNAL MEDICINE

## 2024-05-13 PROCEDURE — 3017F COLORECTAL CA SCREEN DOC REV: CPT | Performed by: INTERNAL MEDICINE

## 2024-05-13 PROCEDURE — 1123F ACP DISCUSS/DSCN MKR DOCD: CPT | Performed by: INTERNAL MEDICINE

## 2024-05-13 PROCEDURE — G8419 CALC BMI OUT NRM PARAM NOF/U: HCPCS | Performed by: INTERNAL MEDICINE

## 2024-05-13 RX ORDER — ONDANSETRON 2 MG/ML
8 INJECTION INTRAMUSCULAR; INTRAVENOUS
OUTPATIENT
Start: 2024-05-24

## 2024-05-13 RX ORDER — PROCHLORPERAZINE MALEATE 10 MG
10 TABLET ORAL EVERY 6 HOURS PRN
Qty: 120 TABLET | Refills: 3 | Status: SHIPPED | OUTPATIENT
Start: 2024-05-13 | End: 2024-05-16 | Stop reason: SDUPTHER

## 2024-05-13 RX ORDER — ACETAMINOPHEN 325 MG/1
650 TABLET ORAL
OUTPATIENT
Start: 2024-05-24

## 2024-05-13 RX ORDER — FAMOTIDINE 10 MG/ML
20 INJECTION, SOLUTION INTRAVENOUS
OUTPATIENT
Start: 2024-05-24

## 2024-05-13 RX ORDER — EPINEPHRINE 1 MG/ML
0.3 INJECTION, SOLUTION, CONCENTRATE INTRAVENOUS PRN
OUTPATIENT
Start: 2024-05-24

## 2024-05-13 RX ORDER — DIPHENHYDRAMINE HYDROCHLORIDE 50 MG/ML
50 INJECTION INTRAMUSCULAR; INTRAVENOUS
OUTPATIENT
Start: 2024-05-24

## 2024-05-13 RX ORDER — HEPARIN SODIUM (PORCINE) LOCK FLUSH IV SOLN 100 UNIT/ML 100 UNIT/ML
500 SOLUTION INTRAVENOUS PRN
OUTPATIENT
Start: 2024-05-24

## 2024-05-13 RX ORDER — ALBUTEROL SULFATE 90 UG/1
4 AEROSOL, METERED RESPIRATORY (INHALATION) PRN
OUTPATIENT
Start: 2024-05-24

## 2024-05-13 RX ORDER — SODIUM CHLORIDE 9 MG/ML
5-250 INJECTION, SOLUTION INTRAVENOUS PRN
OUTPATIENT
Start: 2024-05-24

## 2024-05-13 RX ORDER — SODIUM CHLORIDE 0.9 % (FLUSH) 0.9 %
5-40 SYRINGE (ML) INJECTION PRN
OUTPATIENT
Start: 2024-05-24

## 2024-05-13 RX ORDER — SODIUM CHLORIDE 9 MG/ML
INJECTION, SOLUTION INTRAVENOUS CONTINUOUS
OUTPATIENT
Start: 2024-05-24

## 2024-05-13 RX ORDER — MEPERIDINE HYDROCHLORIDE 50 MG/ML
12.5 INJECTION INTRAMUSCULAR; INTRAVENOUS; SUBCUTANEOUS PRN
OUTPATIENT
Start: 2024-05-24

## 2024-05-13 NOTE — PROGRESS NOTES
[facility] [place] [address] [facility], [address]  [facility] [address]  Dept: [phone]  Dept Fax: [phone] Loc: [phone]      Visit Date:5/13/2024     Dann Turner is a 73 y.o. male who presents today for:   Chief Complaint   Patient presents with    Follow-up     Hepatocellular carcinoma in adult (HCC)        HPI:   Dann Turner is a 73 y.o. male referred to Hematology/Oncology clinic for evaluation of hepatocellular carcinoma.  The patient's history goes back to 2021 when he was found to have a 6-1/2 cm lesion on MRI consistent with hepatocellular carcinoma in the background of cirrhosis.  He did have a biopsy of the liver in 2020 and this demonstrated cirrhosis but no evidence of malignancy.  He had elevation of his alpha-fetoprotein, was seen at the [facility],and because of this had embolization of the lesion in the liver.  All of his subsequent there treatment has been at the [facility]. He subsequently has had multiple liver directed therapies for progression including Yterrium 90 and trans arterial hepatic embolization (TACE).  Most recently on an MRI in March demonstrated new lesions that were not amenable to local therapy.  Of note the patient does not have histological confirmation of disease and has not had tumor genomic sequencing.  Follow-up MRI has demonstrated progression of discrete areas in the liver.  There is a 1.3 x 1 cm lesion in segment 7 there is a 1.2 x 1.1 cm lesion in segment 6 there is a 2 x 1.4 cm area in segment 3.  1.7 x 1.4 cm in segment 3 as well and a another 1.2 x 1 cm area in segment 3.  All are consistent with hepatocellular carcinoma.  In addition his alpha-fetoprotein has increased to 195 from 15.2 in August 2023.    Current status: Patient with the above history of hepatocellular carcinoma treated with localized therapies.  MRI and alpha-fetoprotein demonstrate progression of disease in multiple areas.  He is not a candidate for further

## 2024-05-13 NOTE — PATIENT INSTRUCTIONS
Send for [facility] Cdx (tissue) after liver biopsy  Start pembro/lenvima 5/24  F/U 3 weeks after 5/24  Refer in IVR for liver biopsy

## 2024-05-13 NOTE — PROGRESS NOTES
Name: Dann Turner  : 1950  MRN: [id number]    Oncology Navigation Follow-Up Note    Contact Type:  Medical Oncology    Subjective: appt with ONC    Objective:  No pt complaints  ONC POC:  -ONC review of MRI  -Reviewed Alpha Fetoprotein result: 175  -IR to review recent MRI to determine if liver lesion accessible to BX-->IF any lesion is accessible to BX, send tissue for [facility]--> Ranjeet in IR, will request interventional radiologist review MRI for determination & will alert Marcelo accordingly.  -ONC review of tx with  pembro every 3 weeks with lenvatinib  -Chemo teaching AFTER PT RECEIVES LENVATINIB-->RX sent by ONC.  Marcelo alerted [employer] Robertson that pt has [facility] benefits for RXS to assist with coverage.   -ONC discussed importance of avoiding all ETOH  -RX Compazine  -ONC informs anticipated tx start , pending Lenvatinib being received & Pembro insurance auth    Assistance Needed: denies    Receptive to Advanced Care Planning / Palliative Care:  deferred    Referrals: N/A    Education: POC reiterated    Notes: Marcelo following to assist    Electronically signed by Mitra Ram RN on 2024 at 4:02 PM

## 2024-05-14 ENCOUNTER — TELEPHONE (OUTPATIENT)
Dept: CASE MANAGEMENT | Age: 74
End: 2024-05-14

## 2024-05-14 NOTE — TELEPHONE ENCOUNTER
Marcelo FU call to PARI- Ranjeet; she informs Interventional Radiologist reviewed MRI, & Liver Lesion can be biopsied.  Marcelo spoke with Anitha in IR; pt will be notified with appt date/time for the procedure.  Marcelo informed Anitha that the BX tissue will need sent for [facility].       Anitha will update Marcelo when the procedure is scheduled & pt has been notified.

## 2024-05-14 NOTE — TELEPHONE ENCOUNTER
Marcelo call to pt; ender left re: IR will be contacting him with an appt date/time for the IR BX liver lesion.

## 2024-05-15 ENCOUNTER — TELEPHONE (OUTPATIENT)
Dept: INFUSION THERAPY | Age: 74
End: 2024-05-15

## 2024-05-15 NOTE — TELEPHONE ENCOUNTER
The patient called stating that [facility] quoted him $100 for compazine. He wants the script forwarded to the [facility] instead. I emailed the nurses to forward a script.

## 2024-05-16 RX ORDER — PROCHLORPERAZINE MALEATE 10 MG
10 TABLET ORAL EVERY 6 HOURS PRN
Qty: 120 TABLET | Refills: 3 | Status: SHIPPED | OUTPATIENT
Start: 2024-05-16

## 2024-05-17 ENCOUNTER — TELEPHONE (OUTPATIENT)
Dept: CASE MANAGEMENT | Age: 74
End: 2024-05-17

## 2024-05-17 NOTE — TELEPHONE ENCOUNTER
Marcelo query to Financial Marcelo status of pt's Lenvatinib RX.     Financial Macrelo alerted RX not seen in EMR.  Marcelo collaboration with Dr. Gutierrez discussed.     RX entered.  Financial Marcelo assisted with request to expedite the RX processing for planning chemo start next week.

## 2024-05-22 NOTE — PROGRESS NOTES
Nostril route daily as needed) 16 g 0    diltiazem (CARDIZEM) 120 MG tablet Take 1 tablet by mouth daily      Omega-3 Fatty Acids (OMEGA 3 PO) Take by mouth (Patient not taking: Reported on 4/19/2024)      BLOOD PRESSURE by Does not apply route. Patient takes medication for hyperstension        No current facility-administered medications for this visit.      No Known Allergies       Review of Systems:   Review of Systems   Pertinent review of systems noted in HPI, all other ROS negative.   Objective:   Physical Exam   /71   Pulse 62   Temp 98.2 °F (36.8 °C) (Oral)   Resp 18   Wt 86.3 kg (190 lb 3.2 oz)   SpO2 97%   BMI 27.68 kg/m²    General appearance: No apparent distress, calm and cooperative.  HEENT: Pupils equal, round, and reactive to light. Conjunctivae/corneas clear. Oral mucosa moist  Neck: Supple, with full range of motion. Trachea midline.   Respiratory:  Normal respiratory effort. Clear to auscultation all lung fields.  Cardiovascular: RRR, S1/S2  Abdomen: Soft, non-tender, non-distended with active BS  Musculoskeletal: No clubbing, cyanosis or edema bilaterally.  He is able to ambulate in office  Skin: Skin color, texture, turgor normal.  No visible rashes or lesions.  Neurologic:  Neurovascularly intact without any focal sensory/motor deficits. Cranial nerves: II-XII intact, grossly non-focal.  Psychiatric: Alert and oriented x 3, thought content appropriate, normal insight  Capillary Refill: < 3 seconds   Peripheral Pulses: +2 palpable      Imaging Studies and Labs:   CBC:   Lab Results   Component Value Date    WBC 2.3 (L) 05/24/2024    HGB 13.5 (L) 05/24/2024    HCT 40.3 (L) 05/24/2024    MCV 92 05/24/2024    PLT 70 (L) 05/24/2024     BMP:   Lab Results   Component Value Date/Time     05/24/2024 09:33 AM     04/19/2024 11:54 AM    K 3.9 05/24/2024 09:33 AM    K 4.6 04/19/2024 11:54 AM     04/19/2024 11:54 AM    CO2 23 04/19/2024 11:54 AM    BUN 13 04/19/2024 11:54 AM

## 2024-05-22 NOTE — PROGRESS NOTES
New chemotherapy validation note:    Diagnosis for chemotherapy: Hepatocellular carcinoma    Regimen ordered: Keytruda + lenvatinib     Lenvatinib, 12 MG Daily Dose, 3 x 4 MG [facility]   [[id number]]  Order DetailsDose: 12 mg Route: Oral Frequency: DAILY   Start Date: 05/17/24 End Date: --    Written Date: 05/17/24 Expiration Date: 05/17/25       Order Status: Active   NF Ordered: Yes NF post-verification: Yes Non-Formulary Code: --   Ordering User: Lena Joe MD Ordering Date/Time: Fri May 17, 2024 1055   Ordering Provider: -- Authorizing Provider: Lena Joe MD           Reference or literature used for validation: LEAP-002 ([id number])   [email or web address].sciencedirect.com/science/article/abs/pii/[email or web address]?[email or web address]           Date literature or guideline last updated   LEAP-002: 12/2023   NCCN: 4/2024    Deviation from literature or guideline used: Combination therapy did not meet specified outcomes with study authors concluding lenvatinib monotherapy remains standard of care     Summary of any verbal or telephone information obtained: N/A - provider not available at time of 1st treatment. Follow up to be documented upon return. For provider rationale     Patric Cohen RPH, PharmD, BCPS  Clinical Pharmacy Specialist  5/22/2024 8:43 AM

## 2024-05-23 ENCOUNTER — HOSPITAL ENCOUNTER (OUTPATIENT)
Dept: INFUSION THERAPY | Age: 74
Discharge: HOME | End: 2024-05-23
Payer: OTHER GOVERNMENT

## 2024-05-23 ENCOUNTER — CLINICAL DOCUMENTATION (OUTPATIENT)
Dept: CASE MANAGEMENT | Age: 74
End: 2024-05-23

## 2024-05-23 PROCEDURE — 99212 OFFICE O/P EST SF 10 MIN: CPT

## 2024-05-23 NOTE — PROGRESS NOTES
Chemotherapy/Immunotherapy Teaching Checklist    Treatment Plan: keytruda with oral lenvima  Frequency: every 21 days        Day of chemo instructions:  [x] Must have    [x] Eat light breakfast  [x] Bring pain medications/other routine medications scheduled during appointment time  [] Hold blood pressure medications morning of treatment    Treatment process:  [x] Flow of appointment  [x] IV access Peripheral start  or  PORT access process [x] EMLA cream  [x] Premedication/ Hydration  [x] Length of treatment  [x] Tour of clinic    Diet and hydration:  [x] Discuss [place] diet    [x] Eating Hints book provided  [x] Importance of hydration 48- 64 ounces daily   [x] Hydration handout provided     Side Effects:  [x] Chemotherapy and you book provided  [x] Side effects and management discussed   [x] Diarrhea[x]Nausea/Vomiting []home antiemetic [x]hair loss[x]Neuropathy[x] Constipation[x] Fatigue[x]Mouth sores[x] Dehydration[x] Skin/Nail Changes []Pneumonitis []Colitis [] Hepatitis []Thyroid changes  []Fertility issues (birth control, sperm/egg banking issues)    Labs:  [x]BMP- renal function and electrolytes discussed  [x] CBC- RBC, WBC with ANC, platelet counts discussed  [x]Understanding your Blood hand out given   [x]Neutropenia handout/Reduce infection handout [x]Thrombocytopenia handout   [x]German discussed    Complications that require immediate attention from physician:  [x]Fever 100.3 [x]signs of infection- chills, fever, burning with urination, worsening cough   [x]Uncontrolled vomiting [x]Uncontrolled diarrhea/constipation   [x]Unable to drink 48 ounces [x]Uncontrolled pain  [x] When to notify provider handout given  [x] After hours contact process discussed    Home instructions:  [x] Instruct to shut the lid and double flush toilet for 48 hours after chemotherapy  [x] Instruct family members to wear gloves when will be handling  body fluids    Support Services:  [x] Financial navigator   [x]RN

## 2024-05-23 NOTE — PLAN OF CARE
Care plan reviewed with patient.  Patient verbalizes understanding of the plan of care and contribute to goal setting.    Problem: Safety - Adult  Goal: Free from fall injury  Outcome: Adequate for Discharge  Flowsheets (Taken 5/23/2024 1558)  Free From Fall Injury:   Instruct family/caregiver on patient safety   Based on caregiver fall risk screen, instruct family/caregiver to ask for assistance with transferring infant if caregiver noted to have fall risk factors  Note: Free from falls while in infusion center. Verbalized understanding of fall prevention and to ask for assistance with ambulation     Problem: Discharge Planning  Goal: Discharge to home or other facility with appropriate resources  Outcome: Adequate for Discharge  Flowsheets (Taken 5/23/2024 1558)  Discharge to home or other facility with appropriate resources:   Identify barriers to discharge with patient and caregiver   Arrange for interpreters to assist at discharge as needed  Note: Verbalized understanding of discharge instructions, follow ups and when to call doctor     Problem: Chronic Conditions and Co-morbidities  Goal: Patient's chronic conditions and co-morbidity symptoms are monitored and maintained or improved  Outcome: Adequate for Discharge  Flowsheets (Taken 5/23/2024 1558)  Care Plan - Patient's Chronic Conditions and Co-Morbidity Symptoms are Monitored and Maintained or Improved:   Collaborate with multidisciplinary team to address chronic and comorbid conditions and prevent exacerbation or deterioration   Monitor and assess patient's chronic conditions and comorbid symptoms for stability, deterioration, or improvement  Note: Patient verbalizes understanding to verbal information given on keytruda and levima,action and possible side effects. Aware to call MD if develop complications.

## 2024-05-23 NOTE — DISCHARGE INSTRUCTIONS
Please contact your Oncologist if you have any questions regarding the chemotherapy Keytruda and lenvima that you received today.

## 2024-05-23 NOTE — PROGRESS NOTES
Marcelo alerted by infusion RN doing chemo teaching that Lenvatinib RX 12 mg, does not match tx plan dose of 20 mg.    The pharmacist documentation reflects plan for collaboration with ONC re: Lenvatinib.    Message to ONC, Dr. Gutierrez.  ONC advises to proceed with immunotherapy as scheduled.

## 2024-05-23 NOTE — PROGRESS NOTES
Patient has oral chemo with him. The dose listed in the treatment plan and the ordered amount that was sent to pharmacy was different. There is also a question in the pharmacy review about dose. Spoke with Evelina Salazar RN and she reached out to Dr Gutierrez and patient is to start Keytruda tomorrow. Still unclear on oral chemo dose. Pavel Martinez Rn also notified. Patient instructed to not start oral chemotherapy until clarified by physician. Patient also stated that while Rn was out of room, patient stated he got a call from [facility] [employer] and they told him not to take medication yet. Patient instructed to come tomorrow as scheduled for initiation of Keytruda. And not to take oral chemo until we have clarified the dose. Patient verbalized understanding.

## 2024-05-24 ENCOUNTER — HOSPITAL ENCOUNTER (OUTPATIENT)
Dept: INFUSION THERAPY | Age: 74
Discharge: HOME | End: 2024-05-24
Payer: OTHER GOVERNMENT

## 2024-05-24 ENCOUNTER — OFFICE VISIT (OUTPATIENT)
Dept: ONCOLOGY | Age: 74
End: 2024-05-24
Payer: OTHER GOVERNMENT

## 2024-05-24 VITALS
HEART RATE: 62 BPM | RESPIRATION RATE: 18 BRPM | TEMPERATURE: 98.2 F | SYSTOLIC BLOOD PRESSURE: 115 MMHG | OXYGEN SATURATION: 97 % | DIASTOLIC BLOOD PRESSURE: 71 MMHG

## 2024-05-24 VITALS
OXYGEN SATURATION: 97 % | RESPIRATION RATE: 18 BRPM | DIASTOLIC BLOOD PRESSURE: 71 MMHG | SYSTOLIC BLOOD PRESSURE: 115 MMHG | TEMPERATURE: 98.2 F | HEART RATE: 62 BPM

## 2024-05-24 VITALS
HEART RATE: 62 BPM | RESPIRATION RATE: 18 BRPM | BODY MASS INDEX: 27.68 KG/M2 | DIASTOLIC BLOOD PRESSURE: 71 MMHG | SYSTOLIC BLOOD PRESSURE: 115 MMHG | TEMPERATURE: 98.2 F | OXYGEN SATURATION: 97 % | WEIGHT: 190.2 LBS

## 2024-05-24 DIAGNOSIS — C22.0 HEPATOCELLULAR CARCINOMA IN ADULT (HCC): Primary | ICD-10-CM

## 2024-05-24 DIAGNOSIS — C22.0 HEPATOCELLULAR CARCINOMA IN ADULT (HCC): ICD-10-CM

## 2024-05-24 LAB
ALBUMIN SERPL BCG-MCNC: 3.4 G/DL (ref 3.5–5.1)
ALP SERPL-CCNC: 233 U/L (ref 38–126)
ALT SERPL W/O P-5'-P-CCNC: 36 U/L (ref 11–66)
AST SERPL-CCNC: 64 U/L (ref 5–40)
BASOPHILS ABSOLUTE: 0 THOU/MM3 (ref 0–0.1)
BASOPHILS NFR BLD AUTO: 0 % (ref 0–3)
BILIRUB CONJ SERPL-MCNC: 0.4 MG/DL (ref 0–0.3)
BILIRUB SERPL-MCNC: 0.8 MG/DL (ref 0.3–1.2)
BILIRUB UR QL STRIP: NEGATIVE
BUN BLDP-MCNC: 9 MG/DL (ref 8–26)
CHARACTER UR: CLEAR
CHLORIDE BLD-SCNC: 107 MEQ/L (ref 98–109)
COLOR UR: ABNORMAL
CORTIS SERPL-MCNC: 8.89 UG/DL
CORTISOL COLLECTION INFO: NORMAL
CREAT BLD-MCNC: 0.8 MG/DL (ref 0.5–1.2)
EOSINOPHIL NFR BLD AUTO: 4 % (ref 0–4)
EOSINOPHILS ABSOLUTE: 0.1 THOU/MM3 (ref 0–0.4)
ERYTHROCYTE [DISTWIDTH] IN BLOOD BY AUTOMATED COUNT: 14.6 % (ref 11.5–14.5)
GFR SERPL CREATININE-BSD FRML MDRD: > 90 ML/MIN/1.73M2
GLUCOSE BLD-MCNC: 94 MG/DL (ref 70–108)
GLUCOSE UR QL STRIP.AUTO: NEGATIVE MG/DL
HBV CORE IGG+IGM SERPL QL IA: NONREACTIVE
HBV SURFACE AB SER QL IA: POSITIVE
HBV SURFACE AG SERPL QL IA: NEGATIVE
HCT VFR BLD AUTO: 40.3 % (ref 42–52)
HGB BLD-MCNC: 13.5 GM/DL (ref 14–18)
HGB UR QL STRIP.AUTO: NEGATIVE
IMMATURE GRANULOCYTES %: 0 %
IMMATURE GRANULOCYTES ABSOLUTE: 0 THOU/MM3 (ref 0–0.07)
IONIZED CALCIUM, WHOLE BLOOD: 1.15 MMOL/L (ref 1.12–1.32)
KETONES UR QL STRIP.AUTO: ABNORMAL
LEUKOCYTE ESTERASE UR QL STRIP.AUTO: NEGATIVE
LYMPHOCYTES ABSOLUTE: 0.7 THOU/MM3 (ref 1–4.8)
LYMPHOCYTES NFR BLD AUTO: 28 % (ref 15–47)
MAGNESIUM SERPL-MCNC: 1.9 MG/DL (ref 1.6–2.4)
MCH RBC QN AUTO: 30.8 PG (ref 26–33)
MCHC RBC AUTO-ENTMCNC: 33.5 GM/DL (ref 32.2–35.5)
MCV RBC AUTO: 92 FL (ref 80–94)
MONOCYTES ABSOLUTE: 0.4 THOU/MM3 (ref 0.4–1.3)
MONOCYTES NFR BLD AUTO: 18 % (ref 0–12)
NEUTROPHILS ABSOLUTE: 1.2 THOU/MM3 (ref 1.8–7.7)
NEUTROPHILS NFR BLD AUTO: 50 % (ref 43–75)
NITRITE UR QL STRIP.AUTO: NEGATIVE
PH UR STRIP.AUTO: 6 [PH] (ref 5–9)
PLATELET # BLD AUTO: 70 THOU/MM3 (ref 130–400)
PMV BLD AUTO: 10.9 FL (ref 9.4–12.4)
POTASSIUM BLD-SCNC: 3.9 MEQ/L (ref 3.5–4.9)
PROT SERPL-MCNC: 6.7 G/DL (ref 6.1–8)
PROT UR STRIP.AUTO-MCNC: NEGATIVE MG/DL
RBC # BLD AUTO: 4.39 MILL/MM3 (ref 4.7–6.1)
SODIUM BLD-SCNC: 141 MEQ/L (ref 138–146)
SP GR UR REFRACT.AUTO: 1.02 (ref 1–1.03)
TOTAL CO2, WHOLE BLOOD: 23 MEQ/L (ref 23–33)
TSH SERPL DL<=0.005 MIU/L-ACNC: 1.49 UIU/ML (ref 0.4–4.2)
UROBILINOGEN UR QL STRIP.AUTO: 1 EU/DL (ref 0–1)
WBC # BLD AUTO: 2.3 THOU/MM3 (ref 4.8–10.8)

## 2024-05-24 PROCEDURE — 86706 HEP B SURFACE ANTIBODY: CPT

## 2024-05-24 PROCEDURE — 80047 BASIC METABLC PNL IONIZED CA: CPT

## 2024-05-24 PROCEDURE — 99215 OFFICE O/P EST HI 40 MIN: CPT | Performed by: NURSE PRACTITIONER

## 2024-05-24 PROCEDURE — 80076 HEPATIC FUNCTION PANEL: CPT

## 2024-05-24 PROCEDURE — 1123F ACP DISCUSS/DSCN MKR DOCD: CPT | Performed by: NURSE PRACTITIONER

## 2024-05-24 PROCEDURE — 82533 TOTAL CORTISOL: CPT

## 2024-05-24 PROCEDURE — 1036F TOBACCO NON-USER: CPT | Performed by: NURSE PRACTITIONER

## 2024-05-24 PROCEDURE — 99211 OFF/OP EST MAY X REQ PHY/QHP: CPT

## 2024-05-24 PROCEDURE — 85025 COMPLETE CBC W/AUTO DIFF WBC: CPT

## 2024-05-24 PROCEDURE — 36415 COLL VENOUS BLD VENIPUNCTURE: CPT

## 2024-05-24 PROCEDURE — 83735 ASSAY OF MAGNESIUM: CPT

## 2024-05-24 PROCEDURE — 81003 URINALYSIS AUTO W/O SCOPE: CPT

## 2024-05-24 PROCEDURE — 84443 ASSAY THYROID STIM HORMONE: CPT

## 2024-05-24 PROCEDURE — 3017F COLORECTAL CA SCREEN DOC REV: CPT | Performed by: NURSE PRACTITIONER

## 2024-05-24 PROCEDURE — 86704 HEP B CORE ANTIBODY TOTAL: CPT

## 2024-05-24 PROCEDURE — G8419 CALC BMI OUT NRM PARAM NOF/U: HCPCS | Performed by: NURSE PRACTITIONER

## 2024-05-24 PROCEDURE — 87340 HEPATITIS B SURFACE AG IA: CPT

## 2024-05-24 PROCEDURE — G8427 DOCREV CUR MEDS BY ELIG CLIN: HCPCS | Performed by: NURSE PRACTITIONER

## 2024-05-24 NOTE — PATIENT INSTRUCTIONS
HOLD treatment today   Return to clinic in 2 weeks to see Dr. Gutierrez with labs   Treatment in 2 weeks   DO NOT start your new pills at home.

## 2024-05-31 ENCOUNTER — HOSPITAL ENCOUNTER (OUTPATIENT)
Dept: CT IMAGING | Age: 74
Discharge: HOME | End: 2024-05-31
Payer: OTHER GOVERNMENT

## 2024-05-31 VITALS
HEART RATE: 59 BPM | WEIGHT: 189.2 LBS | TEMPERATURE: 96.9 F | BODY MASS INDEX: 28.02 KG/M2 | DIASTOLIC BLOOD PRESSURE: 63 MMHG | RESPIRATION RATE: 18 BRPM | HEIGHT: 69 IN | SYSTOLIC BLOOD PRESSURE: 102 MMHG | OXYGEN SATURATION: 95 %

## 2024-05-31 DIAGNOSIS — C22.0 HEPATOCELLULAR CARCINOMA IN ADULT (HCC): ICD-10-CM

## 2024-05-31 LAB
CREAT SERPL-MCNC: 0.8 MG/DL (ref 0.4–1.2)
DEPRECATED RDW RBC AUTO: 50.8 FL (ref 35–45)
ERYTHROCYTE [DISTWIDTH] IN BLOOD BY AUTOMATED COUNT: 15 % (ref 11.5–14.5)
GFR SERPL CREATININE-BSD FRML MDRD: > 90 ML/MIN/1.73M2
HCT VFR BLD AUTO: 43.7 % (ref 42–52)
HGB BLD-MCNC: 14.6 GM/DL (ref 14–18)
MCH RBC QN AUTO: 30.7 PG (ref 26–33)
MCHC RBC AUTO-ENTMCNC: 33.4 GM/DL (ref 32.2–35.5)
MCV RBC AUTO: 92 FL (ref 80–94)
PLATELET # BLD AUTO: 81 THOU/MM3 (ref 130–400)
PMV BLD AUTO: 11.7 FL (ref 9.4–12.4)
RBC # BLD AUTO: 4.75 MILL/MM3 (ref 4.7–6.1)
WBC # BLD AUTO: 3 THOU/MM3 (ref 4.8–10.8)

## 2024-05-31 PROCEDURE — 2580000003 HC RX 258: Performed by: RADIOLOGY

## 2024-05-31 PROCEDURE — 6360000002 HC RX W HCPCS: Performed by: RADIOLOGY

## 2024-05-31 PROCEDURE — 82565 ASSAY OF CREATININE: CPT

## 2024-05-31 PROCEDURE — 6360000004 HC RX CONTRAST MEDICATION: Performed by: RADIOLOGY

## 2024-05-31 PROCEDURE — 6370000000 HC RX 637 (ALT 250 FOR IP): Performed by: RADIOLOGY

## 2024-05-31 PROCEDURE — 36415 COLL VENOUS BLD VENIPUNCTURE: CPT

## 2024-05-31 PROCEDURE — 85027 COMPLETE CBC AUTOMATED: CPT

## 2024-05-31 PROCEDURE — 77012 CT SCAN FOR NEEDLE BIOPSY: CPT

## 2024-05-31 RX ORDER — FENTANYL CITRATE 50 UG/ML
INJECTION, SOLUTION INTRAMUSCULAR; INTRAVENOUS PRN
Status: COMPLETED | OUTPATIENT
Start: 2024-05-31 | End: 2024-05-31

## 2024-05-31 RX ORDER — SODIUM CHLORIDE 450 MG/100ML
INJECTION, SOLUTION INTRAVENOUS CONTINUOUS
Status: DISCONTINUED | OUTPATIENT
Start: 2024-05-31 | End: 2024-06-01 | Stop reason: HOSPADM

## 2024-05-31 RX ORDER — IBUPROFEN 200 MG
TABLET ORAL PRN
Status: COMPLETED | OUTPATIENT
Start: 2024-05-31 | End: 2024-05-31

## 2024-05-31 RX ORDER — MIDAZOLAM HYDROCHLORIDE 1 MG/ML
INJECTION INTRAMUSCULAR; INTRAVENOUS PRN
Status: COMPLETED | OUTPATIENT
Start: 2024-05-31 | End: 2024-05-31

## 2024-05-31 RX ADMIN — MIDAZOLAM 1 MG: 1 INJECTION INTRAMUSCULAR; INTRAVENOUS at 08:23

## 2024-05-31 RX ADMIN — SODIUM CHLORIDE: 4.5 INJECTION, SOLUTION INTRAVENOUS at 07:33

## 2024-05-31 RX ADMIN — FENTANYL CITRATE 50 MCG: 50 INJECTION, SOLUTION INTRAMUSCULAR; INTRAVENOUS at 08:23

## 2024-05-31 RX ADMIN — IOPAMIDOL 80 ML: 755 INJECTION, SOLUTION INTRAVENOUS at 08:48

## 2024-05-31 RX ADMIN — BACITRACIN ZINC, NEOMYCIN SULFATE, AND POLYMYXIN B SULFATE 1 EACH: 400; 3.5; 5 OINTMENT TOPICAL at 08:52

## 2024-05-31 ASSESSMENT — PAIN SCALES - GENERAL
PAINLEVEL_OUTOF10: 0

## 2024-05-31 NOTE — OP NOTE
Department of Radiology  Post Procedure Progress Note      Pre-Procedure Diagnosis:  Liver masses    Procedure Performed:  CT guided liver mass biopsy    Anesthesia: local / versed and fentanyl    Findings: successful    Immediate Complications:  None    Estimated Blood Loss: minimal    SEE DICTATED PROCEDURE NOTE FOR COMPLETE DETAILS.    Electronically signed by Jose Rodríguez MD on 5/31/2024 at 9:22 AM

## 2024-05-31 NOTE — H&P
[facility]  Sedation/Analgesia History & Physical    Pt Name: Dann Turner  MRN: [id number]  YOB: 1950  Provider Performing Procedure: Jose Rodríguez MD, MD  Primary Care Physician: Elisabeth Valle MD    Formulation and discussion of sedation / procedure plans, risks, benefits, side effects and alternatives with patient and/or responsible adult completed.    PRE-PROCEDURE   DNR-CCA/DNR-CC []Yes [x]No  Brief History/Pre-Procedure Diagnosis: Liver masses          MEDICAL HISTORY  []CAD/Valve  []Liver Disease  []Lung Disease []Diabetes  []Hypertension []Renal Disease  [x]Additional information:       has a past medical history of Cancer (HCC), Hyperlipidemia, and Hypertension.    SURGICAL HISTORY   has a past surgical history that includes Nerve Surgery and other surgical history (02/02/2024).  Additional information:       ALLERGIES   Allergies as of 05/31/2024    (No Known Allergies)     Additional information:       MEDICATIONS   Coumadin Use Last 5 Days [x]No []Yes  Antiplatelet drug therapy use last 5 days  [x]No []Yes  Other anticoagulant use last 5 days  [x]No []Yes    Current Outpatient Medications:     Lenvatinib, 12 MG Daily Dose, 3 x 4 MG CPPK, Take 12 mg by mouth daily, Disp: 30 each, Rfl: 0    prochlorperazine (COMPAZINE) 10 MG tablet, Take 1 tablet by mouth every 6 hours as needed (nausea), Disp: 120 tablet, Rfl: 3    tamsulosin (FLOMAX) 0.4 MG capsule, Take 1 capsule by mouth daily, Disp: , Rfl:     carvedilol (COREG) 6.25 MG tablet, Take 0.5 tablets by mouth daily, Disp: , Rfl:     vitamin D (VITAMIN D3) 50 MCG (2000 UT) CAPS capsule, Take 1 capsule by mouth daily, Disp: , Rfl:     omeprazole (PRILOSEC) 20 MG delayed release capsule, Take 1 capsule by mouth daily, Disp: , Rfl:     atorvastatin (LIPITOR) 20 MG tablet, Take by mouth, Disp: , Rfl:     cetirizine (ZYRTEC) 10 MG tablet, Take 1 tablet by mouth at bedtime, Disp: , Rfl:     fluticasone (FLONASE) 50

## 2024-05-31 NOTE — PROGRESS NOTES
0745 Pt arrived to CT for liver mass biopsy. Procedure explained using teach back method. Pt states understanding.  0756 Dr Rodríguez into assess patient and explain procedure.  0759 This procedure has been fully reviewed with the patient and written informed consent has been obtained.   0800 Patient assisted to table in supine position. Monitor attached to patient. Comfort ensured.  0807 Pre-procedure images obtained.   0829 Pathology called.   0830 Procedure begins.  0838 Pathology arrived.   0845 CT scan with contrast obtained.   0849 Procedure complete. Samples obtained and given to the pathologist.   0850 Post-procedure images obtained.  0852 Monitor removed. Ointment and band aid applied to puncture site. Patient assisted to cart in semi fowlers position. Comfort ensured.  0854 Patient transported to OPN in stable condition. Report called to Laxmi QUINTERO in OPN.

## 2024-05-31 NOTE — DISCHARGE INSTRUCTIONS
responsible help you with your care.  Do not drive for 24 hours.  Do not operate equipment for 24 hours (lawnmowers, power tools, kitchen accessories, stove).  Do not drink any alcoholic beverages for a minimum of 24 hours.  Do not make important personal, legal or business decisions for 24 hours.  You may experience dizziness or lightheadedness.  Move slowly and carefully, do not make sudden position changes.  Drink extra amounts of fluids today.  Increase your diet as tolerated (unless you have received specific instructions from your doctor).  If you feel nauseated, continue with liquids until nausea is gone  Notify your physician if you have not urinated within 8 hours after the procedure.  Resume your medications unless otherwise instructed.  contact your physician if you have any questions or concerns.  I have been informed and understand how to care for myself/the patient at home.  i know who to call if Ihave question or concerns.      You have received the sedation/analgesia medications/s:       IF YOU REPORT TO AN EMERGENCY ROOM, DOCTOR'S OFFICE OR HOSPITAL WITHIN 24 HRS AFTER PROCEDURE, BRING THIS SHEET WITH YOU AND GIVE IT THE PHYSICIAN OR NURSE ATTENDING YOU.

## 2024-05-31 NOTE — PROGRESS NOTES
0705 pt arrives ambulatory for liver bx. Procedure explained and questions answered. PT RIGHTS AND RESPONSIBILITIES OFFERED TO PT. Pt denies taking blood thinners in past 5 days. Pt has been NPO since 5/30/24.  0728 labs drawn and sent down as ordered.   0738 pt to CT via bed.   0859 pt back from CT. Vitals stable. Pt denies pain. Bandaid on right lateral mid chest clean, dry and intact. No bleeding drainage redness or swelling noted. Site is soft. Pt's wife star called and updated.   0915 vitals stable. Site unchanged. Pt resting quietly. Pt denies pain.   0930 vitals stable. Pt denies needs. Spouse at bedside. Site unchanged.   0945 vitals stable. Site unchanged. Pt provided with lunch and water. Pt denies other needs. Pt denies pain.   1000 vitals stable. Site unchanged. Pt tolerating lunch.   1110 vitals stable. Site unchanged. Pt denies pain. Pt ambulated to restroom with no complaints. Spouse at bedside.   1200 vitals stable. Site unchanged. Pt denies pain. Pt eating lunch with wife.   1303 pt discharged via wheelchair with instructions with no complaints. Pt denies pain. Bandaid on right lateral mid chest clean, dry and intact. No bleeding drainage redness or swelling noted. Site is soft.           _m___ Safety:       (Environmental)  [place] to environment  Ensure ID band is correct and in place/ allergy band as needed  Assess for fall risk  Initiate fall precautions as applicable (fall band, side rails, etc.)  Call light within reach  Bed in low position/ wheels locked    __m__ Pain:       Assess pain level and characteristics  Administer analgesics as ordered  Assess effectiveness of pain management and report to MD as needed    __m__ Knowledge Deficit:  Assess baseline knowledge  Provide teaching at level of understanding  Provide teaching via preferred learning method  Evaluate teaching effectiveness    __m__ Hemodynamic/Respiratory Status:       (Pre and Post Procedure Monitoring)  Assess/Monitor vital

## 2024-06-06 DIAGNOSIS — C22.0 HEPATOCELLULAR CARCINOMA IN ADULT (HCC): Primary | ICD-10-CM

## 2024-06-06 LAB
MISC #3 REFERENCE REPORT: NORMAL
MISC. #1 REFERENCE GROUP TEST: NORMAL
MISC. #2 REFERENCE REPORT: NORMAL

## 2024-06-07 ENCOUNTER — OFFICE VISIT (OUTPATIENT)
Dept: ONCOLOGY | Age: 74
End: 2024-06-07
Payer: OTHER GOVERNMENT

## 2024-06-07 ENCOUNTER — HOSPITAL ENCOUNTER (OUTPATIENT)
Dept: INFUSION THERAPY | Age: 74
Discharge: HOME | End: 2024-06-07
Payer: OTHER GOVERNMENT

## 2024-06-07 ENCOUNTER — CLINICAL DOCUMENTATION (OUTPATIENT)
Dept: CASE MANAGEMENT | Age: 74
End: 2024-06-07

## 2024-06-07 VITALS
WEIGHT: 192 LBS | RESPIRATION RATE: 16 BRPM | HEART RATE: 70 BPM | DIASTOLIC BLOOD PRESSURE: 64 MMHG | OXYGEN SATURATION: 97 % | BODY MASS INDEX: 28.44 KG/M2 | SYSTOLIC BLOOD PRESSURE: 100 MMHG | HEIGHT: 69 IN | TEMPERATURE: 98.1 F

## 2024-06-07 VITALS
DIASTOLIC BLOOD PRESSURE: 64 MMHG | HEART RATE: 70 BPM | SYSTOLIC BLOOD PRESSURE: 100 MMHG | RESPIRATION RATE: 16 BRPM | TEMPERATURE: 98.1 F | OXYGEN SATURATION: 97 %

## 2024-06-07 DIAGNOSIS — C22.0 HEPATOCELLULAR CARCINOMA IN ADULT (HCC): Primary | ICD-10-CM

## 2024-06-07 DIAGNOSIS — C22.0 HEPATOCELLULAR CARCINOMA IN ADULT (HCC): ICD-10-CM

## 2024-06-07 LAB
ALBUMIN SERPL BCG-MCNC: 3.5 G/DL (ref 3.5–5.1)
ALP SERPL-CCNC: 234 U/L (ref 38–126)
ALT SERPL W/O P-5'-P-CCNC: 32 U/L (ref 11–66)
AST SERPL-CCNC: 57 U/L (ref 5–40)
BASOPHILS ABSOLUTE: 0 THOU/MM3 (ref 0–0.1)
BASOPHILS NFR BLD AUTO: 1 % (ref 0–3)
BILIRUB CONJ SERPL-MCNC: 0.4 MG/DL (ref 0–0.3)
BILIRUB SERPL-MCNC: 0.8 MG/DL (ref 0.3–1.2)
BUN BLDP-MCNC: 10 MG/DL (ref 8–26)
CHLORIDE BLD-SCNC: 104 MEQ/L (ref 98–109)
CREAT BLD-MCNC: 0.7 MG/DL (ref 0.5–1.2)
EOSINOPHIL NFR BLD AUTO: 4 % (ref 0–4)
EOSINOPHILS ABSOLUTE: 0.1 THOU/MM3 (ref 0–0.4)
ERYTHROCYTE [DISTWIDTH] IN BLOOD BY AUTOMATED COUNT: 14.7 % (ref 11.5–14.5)
GFR SERPL CREATININE-BSD FRML MDRD: > 90 ML/MIN/1.73M2
GLUCOSE BLD-MCNC: 134 MG/DL (ref 70–108)
HCT VFR BLD AUTO: 40.6 % (ref 42–52)
HGB BLD-MCNC: 13.6 GM/DL (ref 14–18)
IMMATURE GRANULOCYTES %: 0 %
IMMATURE GRANULOCYTES ABSOLUTE: 0 THOU/MM3 (ref 0–0.07)
IONIZED CALCIUM, WHOLE BLOOD: 1.17 MMOL/L (ref 1.12–1.32)
LYMPHOCYTES ABSOLUTE: 0.6 THOU/MM3 (ref 1–4.8)
LYMPHOCYTES NFR BLD AUTO: 26 % (ref 15–47)
MCH RBC QN AUTO: 30.6 PG (ref 26–33)
MCHC RBC AUTO-ENTMCNC: 33.5 GM/DL (ref 32.2–35.5)
MCV RBC AUTO: 91 FL (ref 80–94)
MONOCYTES ABSOLUTE: 0.3 THOU/MM3 (ref 0.4–1.3)
MONOCYTES NFR BLD AUTO: 14 % (ref 0–12)
NEUTROPHILS ABSOLUTE: 1.2 THOU/MM3 (ref 1.8–7.7)
NEUTROPHILS NFR BLD AUTO: 55 % (ref 43–75)
PLATELET # BLD AUTO: 74 THOU/MM3 (ref 130–400)
PMV BLD AUTO: 10.8 FL (ref 9.4–12.4)
POTASSIUM BLD-SCNC: 3.8 MEQ/L (ref 3.5–4.9)
PROT SERPL-MCNC: 6.6 G/DL (ref 6.1–8)
RBC # BLD AUTO: 4.44 MILL/MM3 (ref 4.7–6.1)
SODIUM BLD-SCNC: 141 MEQ/L (ref 138–146)
TOTAL CO2, WHOLE BLOOD: 21 MEQ/L (ref 23–33)
WBC # BLD AUTO: 2.2 THOU/MM3 (ref 4.8–10.8)

## 2024-06-07 PROCEDURE — 80076 HEPATIC FUNCTION PANEL: CPT

## 2024-06-07 PROCEDURE — 99211 OFF/OP EST MAY X REQ PHY/QHP: CPT

## 2024-06-07 PROCEDURE — 99214 OFFICE O/P EST MOD 30 MIN: CPT | Performed by: INTERNAL MEDICINE

## 2024-06-07 PROCEDURE — 85025 COMPLETE CBC W/AUTO DIFF WBC: CPT

## 2024-06-07 PROCEDURE — 80047 BASIC METABLC PNL IONIZED CA: CPT

## 2024-06-07 PROCEDURE — 1123F ACP DISCUSS/DSCN MKR DOCD: CPT | Performed by: INTERNAL MEDICINE

## 2024-06-07 PROCEDURE — 36415 COLL VENOUS BLD VENIPUNCTURE: CPT

## 2024-06-07 NOTE — PROGRESS NOTES
Name: Dann Turner  : 1950  MRN: [id number]    Oncology Navigation Follow-Up Note    Contact Type:  Medical Oncology    Subjective: appt with ONC    Objective:     ONC POC:  -Onc discussed DX-->Pathology notified prelim path report favors Hepatocellular ca, but tissue block sent off for additional stains.   -ONC reviewed tx plan:  Decision made to proceed with single agent, Lenvatinib 4 mg (3) tabs daily, beginning today, 2024   -RX Compazine for pre-med 1 hr prior to taking Lenvatinib, if pt desires to reduce risk of nausea  -Pt advised by ONC to monitor BP, as Lenvatinib can cause BP elevation  -Pt alerted to have Imodium available to take for diarrhea, up to 6 tabs in 24 hrs.  -[facility] Liquid BX today-->drawn, & Courtney alerted; pt with [facility] benefits, [facility] should be covered at 100%, per Dr Gutierrez.  -Return in 2-3 weeks to see ONC   -Marcelo call next week for pt tolerance to Lenvatinib.    Assistance Needed: denies    Receptive to Advanced Care Planning / Palliative Care:  deferred    Referrals: NA    Education: POC reiterated    Notes: Marcelo following to assist as needed.      Electronically signed by Mitra Ram RN on 2024 at 2:30 PM

## 2024-06-07 NOTE — PROGRESS NOTES
and this is not finalized but preliminary results indicate that this is consistent with hepatocellular carcinoma.  Maeve classification of his liver disease as a Child A.  ECOG performance status is a 0. He reports that he has received his lenvatinib.  No other problems or concerns.    Past Medical History:   Diagnosis Date    Cancer (HCC)     liver    Hyperlipidemia     Hypertension       Past Surgical History:   Procedure Laterality Date    CT NEEDLE BIOPSY LIVER PERCUTANEOUS  5/31/2024    CT NEEDLE BIOPSY LIVER PERCUTANEOUS 5/31/2024 [facility] CT SCAN    NERVE SURGERY      pinched nerve in left arm    OTHER SURGICAL HISTORY  02/02/2024    \"chemo embolization\" of liver tumor      Family History   Problem Relation Age of Onset    Stroke Father       Social History     Tobacco Use    Smoking status: Former    Smokeless tobacco: Never   Substance Use Topics    Alcohol use: Yes     Comment: occasional      Current Outpatient Medications   Medication Sig Dispense Refill    metoprolol succinate (TOPROL XL) 12.5 mg TB24 Take 1 split-tab by mouth in the morning and at bedtime      Lenvatinib, 12 MG Daily Dose, 3 x 4 MG CPPK Take 12 mg by mouth daily 30 each 0    prochlorperazine (COMPAZINE) 10 MG tablet Take 1 tablet by mouth every 6 hours as needed (nausea) 120 tablet 3    tamsulosin (FLOMAX) 0.4 MG capsule Take 1 capsule by mouth daily      carvedilol (COREG) 6.25 MG tablet Take 0.5 tablets by mouth daily (Patient not taking: Reported on 6/7/2024)      vitamin D (VITAMIN D3) 50 MCG (2000 UT) CAPS capsule Take 1 capsule by mouth daily      omeprazole (PRILOSEC) 20 MG delayed release capsule Take 1 capsule by mouth daily      atorvastatin (LIPITOR) 20 MG tablet Take by mouth      cetirizine (ZYRTEC) 10 MG tablet Take 1 tablet by mouth at bedtime      fluticasone (FLONASE) 50 MCG/ACT nasal spray 1 spray by Each Nostril route in the morning. (Patient taking differently: 1 spray by Each Nostril route daily as needed) 16 g 0

## 2024-06-08 LAB — FOUNDATION MEDICINE RESULT STATUS: NORMAL

## 2024-06-10 ENCOUNTER — TELEPHONE (OUTPATIENT)
Dept: ONCOLOGY | Age: 74
End: 2024-06-10

## 2024-06-10 NOTE — TELEPHONE ENCOUNTER
Patient is calling in to report new side effect from lenvatinib that he began taking on Friday. He has developed new onset hoarseness and it is audibly noticeable when speaking with him. Patient has no other acute side effects such as trouble breathing, mouth sores, rash, nausea, vomiting, appetite changes. He is very worried and wonders if it will get worse the longer he is taking the medication.

## 2024-06-17 LAB
FOUNDATION MEDICINE BLOOD TUMOR MUTATIONAL BURDEN: NORMAL
FOUNDATION MEDICINE CTDNA TUMOR FRACTION: NORMAL
FOUNDATION MEDICINE RESULT STATUS: NORMAL
MSI CA SPEC-IMP: NORMAL

## 2024-06-19 ENCOUNTER — TELEPHONE (OUTPATIENT)
Dept: CASE MANAGEMENT | Age: 74
End: 2024-06-19

## 2024-06-19 ENCOUNTER — HOSPITAL ENCOUNTER (EMERGENCY)
Age: 74
Discharge: HOME | End: 2024-06-19
Attending: FAMILY MEDICINE
Payer: OTHER GOVERNMENT

## 2024-06-19 ENCOUNTER — APPOINTMENT (OUTPATIENT)
Dept: CT IMAGING | Age: 74
End: 2024-06-19
Payer: OTHER GOVERNMENT

## 2024-06-19 VITALS
DIASTOLIC BLOOD PRESSURE: 80 MMHG | HEIGHT: 69 IN | OXYGEN SATURATION: 99 % | TEMPERATURE: 97.9 F | SYSTOLIC BLOOD PRESSURE: 128 MMHG | HEART RATE: 65 BPM | WEIGHT: 185 LBS | RESPIRATION RATE: 20 BRPM | BODY MASS INDEX: 27.4 KG/M2

## 2024-06-19 DIAGNOSIS — D69.6 THROMBOCYTOPENIA (HCC): ICD-10-CM

## 2024-06-19 DIAGNOSIS — Z85.05 HISTORY OF LIVER CANCER: ICD-10-CM

## 2024-06-19 DIAGNOSIS — T50.905A MEDICATION SIDE EFFECT, INITIAL ENCOUNTER: Primary | ICD-10-CM

## 2024-06-19 LAB
ALBUMIN SERPL BCP-MCNC: 2.7 GM/DL (ref 3.4–5)
ALP SERPL-CCNC: 206 U/L (ref 46–116)
ALT SERPL W P-5'-P-CCNC: 39 U/L (ref 14–63)
ANION GAP SERPL CALC-SCNC: 9 MEQ/L (ref 8–16)
ANISOCYTOSIS BLD QL SMEAR: SLIGHT
APTT: 32 SECONDS (ref 22–38)
AST SERPL W P-5'-P-CCNC: 61 U/L (ref 15–37)
BASOPHILS # BLD: 0.8 % (ref 0–3)
BASOPHILS ABSOLUTE: 0 THOU/MM3 (ref 0–0.1)
BILIRUB SERPL-MCNC: 1.3 MG/DL (ref 0.2–1)
BILIRUB UR QL STRIP.AUTO: ABNORMAL
BUN SERPL-MCNC: 12 MG/DL (ref 7–18)
CALCIUM SERPL-MCNC: 8.8 MG/DL (ref 8.5–10.1)
CHARACTER UR: ABNORMAL
CHLORIDE SERPL-SCNC: 106 MEQ/L (ref 98–107)
CO2 SERPL-SCNC: 25 MEQ/L (ref 21–32)
COLOR UR AUTO: ABNORMAL
CREAT SERPL-MCNC: 1 MG/DL (ref 0.6–1.3)
EOSINOPHILS ABSOLUTE: 0.1 THOU/MM3 (ref 0–0.5)
EOSINOPHILS RELATIVE PERCENT: 3.6 % (ref 0–4)
GFR SERPL CREATININE-BSD FRML MDRD: 79 ML/MIN/1.73M2
GLUCOSE SERPL-MCNC: 116 MG/DL (ref 74–106)
GLUCOSE UR QL STRIP.AUTO: NEGATIVE MG/DL
HCT VFR BLD CALC: 44.9 % (ref 42–52)
HEMOGLOBIN: 15 GM/DL (ref 14–18)
HGB UR STRIP.AUTO-MCNC: NEGATIVE MG/L
IMMATURE GRANS (ABS): 0 THOU/MM3 (ref 0–0.07)
IMMATURE GRANULOCYTES %: 0 %
INR BLD: 1.2 (ref 0.85–1.13)
KETONES UR QL STRIP.AUTO: ABNORMAL
LEUKOCYTE ESTERASE UR QL STRIP.AUTO: NEGATIVE
LYMPHOCYTES # BLD AUTO: 29.4 % (ref 15–47)
LYMPHOCYTES ABSOLUTE: 0.7 THOU/MM3 (ref 1–4.8)
MCH RBC QN AUTO: 30.1 PG (ref 26–32)
MCHC RBC AUTO-ENTMCNC: 33.4 GM/DL (ref 31–35)
MCV RBC AUTO: 90.2 FL (ref 80–94)
MONOCYTES: 0.5 THOU/MM3 (ref 0.3–1.3)
MONOCYTES: 18.7 % (ref 0–12)
NEUTROPHILS ABSOLUTE: 1.2 THOU/MM3 (ref 1.8–7.7)
NITRITE UR QL STRIP.AUTO: NEGATIVE
PDW BLD-RTO: 14.6 % (ref 11.5–14.9)
PH UR STRIP.AUTO: 6 [PH] (ref 5–9)
PLATELET # BLD AUTO: 67 THOU/MM3 (ref 130–400)
PLATELET BLD QL SMEAR: ABNORMAL
PMV BLD AUTO: 11.5 FL (ref 9.4–12.4)
POTASSIUM SERPL-SCNC: 3.9 MEQ/L (ref 3.5–5.1)
PROT SERPL-MCNC: 7 GM/DL (ref 6.4–8.2)
PROT UR STRIP.AUTO-MCNC: NEGATIVE MG/DL
RBC # BLD: 4.98 MILL/MM3 (ref 4.5–6.1)
REFLEX TO URINE C & S: ABNORMAL
SCAN OF BLOOD SMEAR: NORMAL
SEG NEUTROPHILS: 47.5 % (ref 43–75)
SODIUM SERPL-SCNC: 140 MEQ/L (ref 136–145)
SP GR UR STRIP.AUTO: 1.02 (ref 1–1.03)
UROBILINOGEN UR STRIP-ACNC: 1 EU/DL (ref 0–1)
WBC # BLD: 2.5 THOU/MM3 (ref 4.8–10.8)

## 2024-06-19 PROCEDURE — 85730 THROMBOPLASTIN TIME PARTIAL: CPT

## 2024-06-19 PROCEDURE — 70450 CT HEAD/BRAIN W/O DYE: CPT

## 2024-06-19 PROCEDURE — 85025 COMPLETE CBC W/AUTO DIFF WBC: CPT

## 2024-06-19 PROCEDURE — 85610 PROTHROMBIN TIME: CPT

## 2024-06-19 PROCEDURE — 80053 COMPREHEN METABOLIC PANEL: CPT

## 2024-06-19 PROCEDURE — 81001 URINALYSIS AUTO W/SCOPE: CPT

## 2024-06-19 PROCEDURE — 99284 EMERGENCY DEPT VISIT MOD MDM: CPT

## 2024-06-19 ASSESSMENT — PAIN - FUNCTIONAL ASSESSMENT
PAIN_FUNCTIONAL_ASSESSMENT: 0-10
PAIN_FUNCTIONAL_ASSESSMENT: NONE - DENIES PAIN
PAIN_FUNCTIONAL_ASSESSMENT: ACTIVITIES ARE NOT PREVENTED

## 2024-06-19 ASSESSMENT — PAIN SCALES - GENERAL: PAINLEVEL_OUTOF10: 3

## 2024-06-19 ASSESSMENT — ENCOUNTER SYMPTOMS
COUGH: 0
ABDOMINAL PAIN: 0
VOMITING: 0
NAUSEA: 0
SHORTNESS OF BREATH: 0

## 2024-06-19 ASSESSMENT — PAIN DESCRIPTION - LOCATION: LOCATION: GENERALIZED

## 2024-06-19 NOTE — ED NOTES
Evelina QUINTERO at [facility] called and discussed patient's symptoms and case d/t the family calling and having instructions from her.  Evelina provided further information on the Chemo Oral agent patient is taking and what falls within the side effect profile and symptoms that he is experiencing that do not fall within the side effect profile for the oral agent.

## 2024-06-19 NOTE — TELEPHONE ENCOUNTER
Marcelo received call from pt's spouse requesting pt be seen sooner than 6/28, as pt has developed hoarseness since starting the Lenvatinib.  \"Unable to speak\", according to pt's spouse.  +Fatigue, +Nosebleeds, +Joint pain., +sleeplessness, +anxiety.  Spouse also reports \"some confusion, almost incoherent at times\".    Denies dysphagia, Denies difficulty breathing.       Message to Dr. Gutierrez for direction.  HOLD Lenvatinib & seek ER evaluation for altered mental status.

## 2024-06-19 NOTE — DISCHARGE INSTRUCTIONS
Stop current chemotherapeutic medication.  Follow-up with Dr. Gutierrez from oncology on Friday as scheduled.  Monitor any new symptoms.  If new symptoms should occur then follow-up with PCP or ED

## 2024-06-19 NOTE — TELEPHONE ENCOUNTER
Marcelo received call from [facility], Riddhi QUINTERO.    Marcelo shared ONC's contact number for ER provider to collaborate.      Marcelo updated Riddhi that ONC has informed pt should HOLD the Lenvatinib for now.  Marcelo shared that ONC's concern is with the reported confusion/incoherent report from pt's wife.  Other reported sxs are noted on the Lenvatinib side effect profile.

## 2024-06-19 NOTE — ED PROVIDER NOTES
[facility]  eMERGENCY dEPARTMENT eNCOUnter          CHIEF COMPLAINT       Chief Complaint   Patient presents with    Fatigue    Anxiety    Insomnia    Medication Reaction       Nurses Notes reviewed and I agree except as noted in the HPI.      HISTORY OF PRESENT ILLNESS    Dann Turner is a 73 y.o. male who presents with history of fatigue,nosebleeds,insomnia,and at times altered mental status. Symptoms started over last week. Patient was diagnosed with liver cancer and started Lenvatinib a couple of weeks ago.  Patient was advised to go to ED for further evaluation.         REVIEW OF SYSTEMS     Review of Systems   Constitutional:  Positive for fatigue. Negative for chills and fever.   Respiratory:  Negative for cough and shortness of breath.    Gastrointestinal:  Negative for abdominal pain, nausea and vomiting.   Genitourinary:  Negative for difficulty urinating and dysuria.   Musculoskeletal:  Negative for arthralgias and myalgias.   Psychiatric/Behavioral:  The patient is nervous/anxious.         Insomnia   All other systems reviewed and are negative.        PAST MEDICAL HISTORY    has a past medical history of Cancer (HCC), Hyperlipidemia, and Hypertension.    SURGICAL HISTORY      has a past surgical history that includes Nerve Surgery; other surgical history (02/02/2024); and CT NEEDLE BIOPSY LIVER PERCUTANEOUS (5/31/2024).    CURRENT MEDICATIONS       Previous Medications    ATORVASTATIN (LIPITOR) 20 MG TABLET    Take by mouth    BLOOD PRESSURE    by Does not apply route. Patient takes medication for hyperstension     CARVEDILOL (COREG) 6.25 MG TABLET    Take 0.5 tablets by mouth daily    CETIRIZINE (ZYRTEC) 10 MG TABLET    Take 1 tablet by mouth at bedtime    DILTIAZEM (CARDIZEM) 120 MG TABLET    Take 1 tablet by mouth daily    FLUTICASONE (FLONASE) 50 MCG/ACT NASAL SPRAY    1 spray by Each Nostril route in the morning.    LENVATINIB, 12 MG DAILY DOSE, 3 X 4 MG CPPK    Take 12 mg by

## 2024-06-19 NOTE — ED NOTES
Patient in stable condition. Alert and oriented x3. Unlabored breathing present. Patient aware of plan of care. Patient discharge instructions given and explained. Follow up information instructions given. Patient agreeable to plan of care. Patient states understanding and denies any questions or concerns. Patient ambulated out of ER with no complications.

## 2024-06-19 NOTE — DISCHARGE INSTR - COC
Continuity of Care Form    Patient Name: Dann Turner   :  1950  MRN:  [id number]    Admit date:  2024  Discharge date:  ***    Code Status Order: No Order   Advance Directives:     Admitting Physician:  No admitting provider for patient encounter.  PCP: Elisabeth Valle MD    Discharging Nurse: ***  Discharging Hospital Unit/Room#: E1/E1  Discharging Unit Phone Number: ***    Emergency Contact:   Extended Emergency Contact Information  Primary Emergency Contact: Mirna Turner  Address: [address] [place] [id number] [facility]  Home Phone: [phone]  Relation: Spouse  Secondary Emergency Contact: Lina Turner   [facility]  Home Phone: [phone]  Relation: Aunt/Uncle    Past Surgical History:  Past Surgical History:   Procedure Laterality Date    CT NEEDLE BIOPSY LIVER PERCUTANEOUS  2024    CT NEEDLE BIOPSY LIVER PERCUTANEOUS 2024 [facility] CT SCAN    NERVE SURGERY      pinched nerve in left arm    OTHER SURGICAL HISTORY  2024    \"chemo embolization\" of liver tumor       Immunization History:   Immunization History   Administered Date(s) Administered    COVID-19, PFIZER Bivalent, DO NOT Dilute, (age 12y+), IM, 30 mcg/0.3 mL 10/08/2022    COVID-19, PFIZER GRAY top, DO NOT Dilute, (age 12 y+), IM, 30 mcg/0.3 mL 2022    COVID-19, PFIZER PURPLE top, DILUTE for use, (age 12 y+), 30mcg/0.3mL 2021, 2021, 10/12/2021    COVID-19, PFIZER, ( formula), (age 12y+), IM, 30mcg/0.3mL 2023       Active Problems:  Patient Active Problem List   Diagnosis Code    Hepatocellular carcinoma in adult (HCC) C22.0       Isolation/Infection:   Isolation            No Isolation          Patient Infection Status       None to display                     Nurse Assessment:  Last Vital Signs: /80   Pulse 65   Temp 97.9 °F (36.6 °C) (Temporal)   Resp 20   Ht 1.753 m (5' 9\")   Wt 83.9 kg (185 lb)   SpO2 99%   BMI 27.32 kg/m²

## 2024-06-21 ENCOUNTER — TELEPHONE (OUTPATIENT)
Dept: CASE MANAGEMENT | Age: 74
End: 2024-06-21

## 2024-06-21 NOTE — TELEPHONE ENCOUNTER
Marcelo received return call from spouse.  She shares pt's voice is back to normal since stopping the Lenvatinib.  Marcelo reiterated to cont holding it until pt sees Dr. Gutierrez on 6/28.  Spouse voiced understanding.

## 2024-06-21 NOTE — TELEPHONE ENCOUNTER
ONC collaboration with Marcelo, informing pt is to HOLD Lenvatinib until he sees ONC in ofce on 6/28.      Call placed to pt's spouse to share the above; no answer; no option for vm.

## 2024-06-25 ENCOUNTER — APPOINTMENT (OUTPATIENT)
Dept: CT IMAGING | Age: 74
End: 2024-06-25
Payer: OTHER GOVERNMENT

## 2024-06-25 ENCOUNTER — HOSPITAL ENCOUNTER (EMERGENCY)
Age: 74
Discharge: HOME | End: 2024-06-25
Attending: FAMILY MEDICINE
Payer: OTHER GOVERNMENT

## 2024-06-25 ENCOUNTER — TELEPHONE (OUTPATIENT)
Dept: ONCOLOGY | Age: 74
End: 2024-06-25

## 2024-06-25 VITALS
WEIGHT: 187 LBS | RESPIRATION RATE: 18 BRPM | DIASTOLIC BLOOD PRESSURE: 80 MMHG | OXYGEN SATURATION: 96 % | TEMPERATURE: 98.1 F | SYSTOLIC BLOOD PRESSURE: 140 MMHG | HEART RATE: 60 BPM | BODY MASS INDEX: 27.7 KG/M2 | HEIGHT: 69 IN

## 2024-06-25 DIAGNOSIS — G45.9 TIA (TRANSIENT ISCHEMIC ATTACK): Primary | ICD-10-CM

## 2024-06-25 LAB
ALBUMIN SERPL BCP-MCNC: 2.7 GM/DL (ref 3.4–5)
ALP SERPL-CCNC: 203 U/L (ref 46–116)
ALT SERPL W P-5'-P-CCNC: 41 U/L (ref 14–63)
ANION GAP SERPL CALC-SCNC: 7 MEQ/L (ref 8–16)
AST SERPL W P-5'-P-CCNC: 60 U/L (ref 15–37)
BASOPHILS # BLD: 0.4 % (ref 0–3)
BASOPHILS ABSOLUTE: 0 THOU/MM3 (ref 0–0.1)
BILIRUB SERPL-MCNC: 1.2 MG/DL (ref 0.2–1)
BILIRUB UR QL STRIP.AUTO: NEGATIVE
BUN SERPL-MCNC: 13 MG/DL (ref 7–18)
CALCIUM SERPL-MCNC: 8.4 MG/DL (ref 8.5–10.1)
CHARACTER UR: CLEAR
CHLORIDE SERPL-SCNC: 102 MEQ/L (ref 98–107)
CO2 SERPL-SCNC: 26 MEQ/L (ref 21–32)
COLOR UR AUTO: YELLOW
CREAT SERPL-MCNC: 0.9 MG/DL (ref 0.6–1.3)
EOSINOPHILS ABSOLUTE: 0.1 THOU/MM3 (ref 0–0.5)
EOSINOPHILS RELATIVE PERCENT: 4.4 % (ref 0–4)
GFR SERPL CREATININE-BSD FRML MDRD: 89 ML/MIN/1.73M2
GLUCOSE SERPL-MCNC: 91 MG/DL (ref 74–106)
GLUCOSE UR QL STRIP.AUTO: NEGATIVE MG/DL
HCT VFR BLD CALC: 39.6 % (ref 42–52)
HEMOGLOBIN: 13.4 GM/DL (ref 14–18)
HGB UR STRIP.AUTO-MCNC: NEGATIVE MG/L
IMMATURE GRANS (ABS): 0 THOU/MM3 (ref 0–0.07)
IMMATURE GRANULOCYTES %: 0 %
KETONES UR QL STRIP.AUTO: NEGATIVE
LEUKOCYTE ESTERASE UR QL STRIP.AUTO: NEGATIVE
LYMPHOCYTES # BLD AUTO: 36.4 % (ref 15–47)
LYMPHOCYTES ABSOLUTE: 0.8 THOU/MM3 (ref 1–4.8)
MCH RBC QN AUTO: 30.9 PG (ref 26–32)
MCHC RBC AUTO-ENTMCNC: 33.8 GM/DL (ref 31–35)
MCV RBC AUTO: 91.2 FL (ref 80–94)
MONOCYTES: 0.3 THOU/MM3 (ref 0.3–1.3)
MONOCYTES: 14 % (ref 0–12)
NEUTROPHILS ABSOLUTE: 1 THOU/MM3 (ref 1.8–7.7)
NITRITE UR QL STRIP.AUTO: NEGATIVE
PDW BLD-RTO: 15.6 % (ref 11.5–14.9)
PH UR STRIP.AUTO: 7 [PH] (ref 5–9)
PLATELET # BLD AUTO: 66 THOU/MM3 (ref 130–400)
PMV BLD AUTO: 10.7 FL (ref 9.4–12.4)
POTASSIUM SERPL-SCNC: 3.9 MEQ/L (ref 3.5–5.1)
PROT SERPL-MCNC: 6.7 GM/DL (ref 6.4–8.2)
PROT UR STRIP.AUTO-MCNC: NEGATIVE MG/DL
RBC # BLD: 4.34 MILL/MM3 (ref 4.5–6.1)
SCAN OF BLOOD SMEAR: NORMAL
SEG NEUTROPHILS: 44.8 % (ref 43–75)
SODIUM SERPL-SCNC: 135 MEQ/L (ref 136–145)
SP GR UR STRIP.AUTO: 1.01 (ref 1–1.03)
TROPONIN, HIGH SENSITIVITY: 19.1 PG/ML (ref 0–76.1)
UROBILINOGEN UR STRIP-ACNC: 0.2 EU/DL (ref 0.2–1)
WBC # BLD: 2.3 THOU/MM3 (ref 4.8–10.8)

## 2024-06-25 PROCEDURE — 70496 CT ANGIOGRAPHY HEAD: CPT

## 2024-06-25 PROCEDURE — 6360000004 HC RX CONTRAST MEDICATION: Performed by: FAMILY MEDICINE

## 2024-06-25 PROCEDURE — 80053 COMPREHEN METABOLIC PANEL: CPT

## 2024-06-25 PROCEDURE — 85025 COMPLETE CBC W/AUTO DIFF WBC: CPT

## 2024-06-25 PROCEDURE — 70498 CT ANGIOGRAPHY NECK: CPT

## 2024-06-25 PROCEDURE — 81003 URINALYSIS AUTO W/O SCOPE: CPT

## 2024-06-25 PROCEDURE — 99285 EMERGENCY DEPT VISIT HI MDM: CPT

## 2024-06-25 PROCEDURE — 70450 CT HEAD/BRAIN W/O DYE: CPT

## 2024-06-25 PROCEDURE — 84484 ASSAY OF TROPONIN QUANT: CPT

## 2024-06-25 RX ADMIN — IOPAMIDOL 100 ML: 755 INJECTION, SOLUTION INTRAVENOUS at 03:53

## 2024-06-25 ASSESSMENT — PAIN - FUNCTIONAL ASSESSMENT
PAIN_FUNCTIONAL_ASSESSMENT: NONE - DENIES PAIN

## 2024-06-25 ASSESSMENT — ENCOUNTER SYMPTOMS
NAUSEA: 0
VOMITING: 0
COUGH: 0
SHORTNESS OF BREATH: 0

## 2024-06-25 NOTE — ED TRIAGE NOTES
Pt comes into ER room 2 walked from triage with right arm numbness / tingling that woke him up around 0245. He is a liver cancer patient and has had some medication changes the last few months. Pt reports that in the last 4 months he has had 8 episodes of the numbness / tingling that always seem to resolve. NIH STROKE ASSESSMENT DONE AND NORMAL.

## 2024-06-25 NOTE — ED NOTES
Pt in bed resting A&O x 3, Resps easy. No concerns voiced at this time. Pt updated on plan of care. Side rails up x 2 call light in reach.  notified.

## 2024-06-25 NOTE — TELEPHONE ENCOUNTER
Patients wife called stating that when the patient wakes up in the mornings his arm to hand is numb as is his tongue. Stated he does sleep on that side but his didn't happen before he started the medication. Stated he is also more nervous and irritable than he was before. Wife is concerned and wanted to know if this could be normal. Please advise.

## 2024-06-25 NOTE — ED NOTES
Pt stable and off to Radiology via ED cart with [employer] tech. Pt states no concerns and vitals stable.

## 2024-06-28 ENCOUNTER — OFFICE VISIT (OUTPATIENT)
Dept: ONCOLOGY | Age: 74
End: 2024-06-28
Payer: OTHER GOVERNMENT

## 2024-06-28 ENCOUNTER — HOSPITAL ENCOUNTER (OUTPATIENT)
Dept: INFUSION THERAPY | Age: 74
Discharge: HOME | End: 2024-06-28
Payer: OTHER GOVERNMENT

## 2024-06-28 VITALS
RESPIRATION RATE: 18 BRPM | DIASTOLIC BLOOD PRESSURE: 79 MMHG | HEART RATE: 73 BPM | BODY MASS INDEX: 28.17 KG/M2 | OXYGEN SATURATION: 98 % | HEIGHT: 69 IN | WEIGHT: 190.2 LBS | SYSTOLIC BLOOD PRESSURE: 116 MMHG | TEMPERATURE: 98.4 F

## 2024-06-28 VITALS
SYSTOLIC BLOOD PRESSURE: 116 MMHG | RESPIRATION RATE: 18 BRPM | HEART RATE: 73 BPM | TEMPERATURE: 98.4 F | OXYGEN SATURATION: 98 % | DIASTOLIC BLOOD PRESSURE: 79 MMHG

## 2024-06-28 DIAGNOSIS — C22.0 HEPATOCELLULAR CARCINOMA IN ADULT (HCC): Primary | ICD-10-CM

## 2024-06-28 DIAGNOSIS — C22.0 HEPATOCELLULAR CARCINOMA IN ADULT (HCC): ICD-10-CM

## 2024-06-28 PROCEDURE — 1123F ACP DISCUSS/DSCN MKR DOCD: CPT | Performed by: INTERNAL MEDICINE

## 2024-06-28 PROCEDURE — 99214 OFFICE O/P EST MOD 30 MIN: CPT | Performed by: INTERNAL MEDICINE

## 2024-06-28 PROCEDURE — 99211 OFF/OP EST MAY X REQ PHY/QHP: CPT

## 2024-06-28 NOTE — PROGRESS NOTES
[facility] [id number] [facility], [address]  [facility] [address]  Dept: [phone]  Dept Fax: [phone] Loc: [phone]      Visit Date:6/28/2024     Dann Turner is a 73 y.o. male who presents today for:   Chief Complaint   Patient presents with    Follow-up     Hepatocellular carcinoma in adult (HCC)        HPI:   Dann Turner is a 73 y.o. male referred to Hematology/Oncology clinic for evaluation of hepatocellular carcinoma.  The patient's history goes back to 2021 when he was found to have a 6-1/2 cm lesion on MRI consistent with hepatocellular carcinoma in the background of cirrhosis.  He did have a biopsy of the liver in 2020 and this demonstrated cirrhosis but no evidence of malignancy.  He had elevation of his alpha-fetoprotein, was seen at the [facility],and because of this had embolization of the lesion in the liver.  All of his subsequent there treatment has been at the [facility]. He subsequently has had multiple liver directed therapies for progression including Yterrium 90 and trans arterial hepatic embolization (TACE).  Most recently on an MRI in March demonstrated new lesions that were not amenable to local therapy.  Of note the patient does not have histological confirmation of disease and has not had tumor genomic sequencing.  Follow-up MRI has demonstrated progression of discrete areas in the liver.  There is a 1.3 x 1 cm lesion in segment 7 there is a 1.2 x 1.1 cm lesion in segment 6 there is a 2 x 1.4 cm area in segment 3.  1.7 x 1.4 cm in segment 3 as well and a another 1.2 x 1 cm area in segment 3.  All are consistent with hepatocellular carcinoma.  In addition his alpha-fetoprotein has increased to 195 from 15.2 in August 2023.  Liver biopsy was positive for recurrence of hepatocellular carcinoma and genomic sequencing with foundation 1 liquid CDX demonstrated an elevated tumor fraction and an amplification of FGF 19 and CCND1.  He was TMB below 10 and

## 2024-07-10 ENCOUNTER — TELEPHONE (OUTPATIENT)
Dept: CASE MANAGEMENT | Age: 74
End: 2024-07-10

## 2024-07-10 NOTE — TELEPHONE ENCOUNTER
Marcelo received call from pt's spouse on Monday 7/8 with request to return call.  Marcelo has attempted to return call on 7/8, 7/9 & 7/10 without success or ability to leave a message.     Marcelo call  to pt today.  Pt's voice is hoarse & he reports he feels \"anxious, & not sleeping at noc\".  Pt reports [facility] & [facility], but he hasn't received a call.    Marcelo advised pt to call clinic in a.m with inquiry.      Marcelo EMR reviewed reflects ONC addressing pt's anxiety complaint, & recommendation to notify [facility] for assistance.

## 2024-07-26 ENCOUNTER — HOSPITAL ENCOUNTER (OUTPATIENT)
Dept: INFUSION THERAPY | Age: 74
Discharge: HOME | End: 2024-07-26
Payer: OTHER GOVERNMENT

## 2024-07-26 ENCOUNTER — OFFICE VISIT (OUTPATIENT)
Dept: ONCOLOGY | Age: 74
End: 2024-07-26
Payer: OTHER GOVERNMENT

## 2024-07-26 VITALS
HEART RATE: 74 BPM | HEIGHT: 69 IN | DIASTOLIC BLOOD PRESSURE: 79 MMHG | TEMPERATURE: 97.5 F | OXYGEN SATURATION: 94 % | BODY MASS INDEX: 27.85 KG/M2 | WEIGHT: 188 LBS | RESPIRATION RATE: 16 BRPM | SYSTOLIC BLOOD PRESSURE: 126 MMHG

## 2024-07-26 VITALS
DIASTOLIC BLOOD PRESSURE: 79 MMHG | HEART RATE: 74 BPM | SYSTOLIC BLOOD PRESSURE: 126 MMHG | TEMPERATURE: 97.5 F | OXYGEN SATURATION: 94 % | RESPIRATION RATE: 16 BRPM

## 2024-07-26 DIAGNOSIS — C22.0 HEPATOCELLULAR CARCINOMA IN ADULT (HCC): ICD-10-CM

## 2024-07-26 DIAGNOSIS — G47.00 INSOMNIA DISORDER RELATED TO KNOWN ORGANIC FACTOR: ICD-10-CM

## 2024-07-26 DIAGNOSIS — C22.0 HEPATOCELLULAR CARCINOMA IN ADULT (HCC): Primary | ICD-10-CM

## 2024-07-26 LAB
ALBUMIN SERPL BCG-MCNC: 3.1 G/DL (ref 3.5–5.1)
ALP SERPL-CCNC: 207 U/L (ref 38–126)
ALT SERPL W/O P-5'-P-CCNC: 32 U/L (ref 11–66)
AST SERPL-CCNC: 65 U/L (ref 5–40)
BASOPHILS ABSOLUTE: 0 THOU/MM3 (ref 0–0.1)
BASOPHILS NFR BLD AUTO: 0 % (ref 0–3)
BILIRUB CONJ SERPL-MCNC: 1.3 MG/DL (ref 0.1–13.8)
BILIRUB SERPL-MCNC: 2.5 MG/DL (ref 0.3–1.2)
BUN BLDP-MCNC: 26 MG/DL (ref 8–26)
CHLORIDE BLD-SCNC: 104 MEQ/L (ref 98–109)
CREAT BLD-MCNC: 1 MG/DL (ref 0.5–1.2)
EOSINOPHIL NFR BLD AUTO: 2 % (ref 0–4)
EOSINOPHILS ABSOLUTE: 0.1 THOU/MM3 (ref 0–0.4)
ERYTHROCYTE [DISTWIDTH] IN BLOOD BY AUTOMATED COUNT: 18 % (ref 11.5–14.5)
GFR SERPL CREATININE-BSD FRML MDRD: 79 ML/MIN/1.73M2
GLUCOSE BLD-MCNC: 66 MG/DL (ref 70–108)
HCT VFR BLD AUTO: 46.5 % (ref 42–52)
HGB BLD-MCNC: 16.3 GM/DL (ref 14–18)
IMMATURE GRANULOCYTES %: 0 %
IMMATURE GRANULOCYTES ABSOLUTE: 0.01 THOU/MM3 (ref 0–0.07)
IONIZED CALCIUM, WHOLE BLOOD: 1.13 MMOL/L (ref 1.12–1.32)
LYMPHOCYTES ABSOLUTE: 0.9 THOU/MM3 (ref 1–4.8)
LYMPHOCYTES NFR BLD AUTO: 23 % (ref 15–47)
MCH RBC QN AUTO: 31.9 PG (ref 26–33)
MCHC RBC AUTO-ENTMCNC: 35.1 GM/DL (ref 32.2–35.5)
MCV RBC AUTO: 91 FL (ref 80–94)
MONOCYTES ABSOLUTE: 0.5 THOU/MM3 (ref 0.4–1.3)
MONOCYTES NFR BLD AUTO: 14 % (ref 0–12)
NEUTROPHILS ABSOLUTE: 2.3 THOU/MM3 (ref 1.8–7.7)
NEUTROPHILS NFR BLD AUTO: 61 % (ref 43–75)
PLATELET # BLD AUTO: 53 THOU/MM3 (ref 130–400)
PMV BLD AUTO: 11.2 FL (ref 9.4–12.4)
POTASSIUM BLD-SCNC: 4.2 MEQ/L (ref 3.5–4.9)
PROT SERPL-MCNC: 6 G/DL (ref 6.1–8)
RBC # BLD AUTO: 5.11 MILL/MM3 (ref 4.7–6.1)
SODIUM BLD-SCNC: 139 MEQ/L (ref 138–146)
TOTAL CO2, WHOLE BLOOD: 24 MEQ/L (ref 23–33)
WBC # BLD AUTO: 3.8 THOU/MM3 (ref 4.8–10.8)

## 2024-07-26 PROCEDURE — 80076 HEPATIC FUNCTION PANEL: CPT

## 2024-07-26 PROCEDURE — G8419 CALC BMI OUT NRM PARAM NOF/U: HCPCS | Performed by: INTERNAL MEDICINE

## 2024-07-26 PROCEDURE — 1036F TOBACCO NON-USER: CPT | Performed by: INTERNAL MEDICINE

## 2024-07-26 PROCEDURE — 36415 COLL VENOUS BLD VENIPUNCTURE: CPT

## 2024-07-26 PROCEDURE — 1123F ACP DISCUSS/DSCN MKR DOCD: CPT | Performed by: INTERNAL MEDICINE

## 2024-07-26 PROCEDURE — G8427 DOCREV CUR MEDS BY ELIG CLIN: HCPCS | Performed by: INTERNAL MEDICINE

## 2024-07-26 PROCEDURE — 85025 COMPLETE CBC W/AUTO DIFF WBC: CPT

## 2024-07-26 PROCEDURE — 99214 OFFICE O/P EST MOD 30 MIN: CPT | Performed by: INTERNAL MEDICINE

## 2024-07-26 PROCEDURE — 80047 BASIC METABLC PNL IONIZED CA: CPT

## 2024-07-26 PROCEDURE — 99211 OFF/OP EST MAY X REQ PHY/QHP: CPT

## 2024-07-26 PROCEDURE — 3017F COLORECTAL CA SCREEN DOC REV: CPT | Performed by: INTERNAL MEDICINE

## 2024-07-26 RX ORDER — ZOLPIDEM TARTRATE 5 MG/1
5 TABLET ORAL NIGHTLY PRN
Qty: 21 TABLET | Refills: 0 | Status: SHIPPED | OUTPATIENT
Start: 2024-07-26 | End: 2024-08-23

## 2024-07-26 RX ORDER — ESCITALOPRAM OXALATE 5 MG/1
5 TABLET ORAL DAILY
COMMUNITY

## 2024-07-26 NOTE — PATIENT INSTRUCTIONS
Continue lenvatinib  MRI for restaging in 4 weeks  Follow up 4 weeks AFTER MRI  Script placed for zolpidem for sleep

## 2024-07-26 NOTE — PROGRESS NOTES
[facility] [id number] [facility] [address] [facility], [address]  [facility] [address]  Dept: [phone]  Dept Fax: [phone] Loc: [phone]      Visit Date:7/26/2024     Dann Turner is a 73 y.o. male who presents today for:   Chief Complaint   Patient presents with    Follow-up     Hepatocellular carcinoma in adult (HCC)        HPI:   Dann Turner is a 73 y.o. male referred to Hematology/Oncology clinic for evaluation of hepatocellular carcinoma.  The patient's history goes back to 2021 when he was found to have a 6-1/2 cm lesion on MRI consistent with hepatocellular carcinoma in the background of cirrhosis.  He did have a biopsy of the liver in 2020 and this demonstrated cirrhosis but no evidence of malignancy.  He had elevation of his alpha-fetoprotein, was seen at the [facility],and because of this had embolization of the lesion in the liver.  All of his subsequent there treatment has been at the [facility]. He subsequently has had multiple liver directed therapies for progression including Yterrium 90 and trans arterial hepatic embolization (TACE).  Most recently on an MRI in March demonstrated new lesions that were not amenable to local therapy.  Of note the patient does not have histological confirmation of disease and has not had tumor genomic sequencing.  Follow-up MRI has demonstrated progression of discrete areas in the liver.  There is a 1.3 x 1 cm lesion in segment 7 there is a 1.2 x 1.1 cm lesion in segment 6 there is a 2 x 1.4 cm area in segment 3.  1.7 x 1.4 cm in segment 3 as well and a another 1.2 x 1 cm area in segment 3.  All are consistent with hepatocellular carcinoma.  In addition his alpha-fetoprotein has increased to 195 from 15.2 in August 2023.  Liver biopsy was positive for recurrence of hepatocellular carcinoma and genomic sequencing with foundation 1 liquid CDX demonstrated an elevated tumor fraction and an amplification of FGF 19 and CCND1.  He was TMB below 10 and

## 2024-08-02 ENCOUNTER — HOSPITAL ENCOUNTER (EMERGENCY)
Age: 74
Discharge: HOME | End: 2024-08-02
Attending: EMERGENCY MEDICINE
Payer: OTHER GOVERNMENT

## 2024-08-02 ENCOUNTER — TELEPHONE (OUTPATIENT)
Dept: ONCOLOGY | Age: 74
End: 2024-08-02

## 2024-08-02 ENCOUNTER — APPOINTMENT (OUTPATIENT)
Dept: GENERAL RADIOLOGY | Age: 74
End: 2024-08-02
Payer: OTHER GOVERNMENT

## 2024-08-02 ENCOUNTER — APPOINTMENT (OUTPATIENT)
Dept: CT IMAGING | Age: 74
End: 2024-08-02
Payer: OTHER GOVERNMENT

## 2024-08-02 VITALS
RESPIRATION RATE: 16 BRPM | WEIGHT: 185 LBS | SYSTOLIC BLOOD PRESSURE: 152 MMHG | TEMPERATURE: 97.3 F | DIASTOLIC BLOOD PRESSURE: 90 MMHG | HEIGHT: 69 IN | BODY MASS INDEX: 27.4 KG/M2 | HEART RATE: 57 BPM | OXYGEN SATURATION: 94 %

## 2024-08-02 DIAGNOSIS — R53.83 FATIGUE, UNSPECIFIED TYPE: Primary | ICD-10-CM

## 2024-08-02 LAB
ALBUMIN SERPL BCG-MCNC: 2.8 G/DL (ref 3.5–5.1)
ALP SERPL-CCNC: 177 U/L (ref 38–126)
ALT SERPL W/O P-5'-P-CCNC: 22 U/L (ref 11–66)
AMMONIA PLAS-MCNC: 54 UMOL/L (ref 11–60)
ANION GAP SERPL CALC-SCNC: 14 MEQ/L (ref 8–16)
AST SERPL-CCNC: 60 U/L (ref 5–40)
BASOPHILS ABSOLUTE: 0 THOU/MM3 (ref 0–0.1)
BASOPHILS NFR BLD AUTO: 0.7 %
BILIRUB SERPL-MCNC: 1.8 MG/DL (ref 0.3–1.2)
BUN SERPL-MCNC: 23 MG/DL (ref 7–22)
CALCIUM SERPL-MCNC: 8.5 MG/DL (ref 8.5–10.5)
CHLORIDE SERPL-SCNC: 102 MEQ/L (ref 98–111)
CO2 SERPL-SCNC: 20 MEQ/L (ref 23–33)
CREAT SERPL-MCNC: 1.3 MG/DL (ref 0.4–1.2)
DEPRECATED RDW RBC AUTO: 60.4 FL (ref 35–45)
EKG ATRIAL RATE: 57 BPM
EKG P AXIS: 24 DEGREES
EKG P-R INTERVAL: 194 MS
EKG Q-T INTERVAL: 464 MS
EKG QRS DURATION: 88 MS
EKG QTC CALCULATION (BAZETT): 451 MS
EKG R AXIS: -42 DEGREES
EKG T AXIS: 14 DEGREES
EKG VENTRICULAR RATE: 57 BPM
EOSINOPHIL NFR BLD AUTO: 3.3 %
EOSINOPHILS ABSOLUTE: 0.1 THOU/MM3 (ref 0–0.4)
ERYTHROCYTE [DISTWIDTH] IN BLOOD BY AUTOMATED COUNT: 18.3 % (ref 11.5–14.5)
ETHANOL SERPL-MCNC: < 0.01 % (ref 0–0.08)
GFR SERPL CREATININE-BSD FRML MDRD: 57 ML/MIN/1.73M2
GLUCOSE SERPL-MCNC: 91 MG/DL (ref 70–108)
HCT VFR BLD AUTO: 46.1 % (ref 42–52)
HGB BLD-MCNC: 15.7 GM/DL (ref 14–18)
IMM GRANULOCYTES # BLD AUTO: 0.02 THOU/MM3 (ref 0–0.07)
IMM GRANULOCYTES NFR BLD AUTO: 0.7 %
INR PPP: 1.18 (ref 0.85–1.13)
LYMPHOCYTES ABSOLUTE: 1 THOU/MM3 (ref 1–4.8)
LYMPHOCYTES NFR BLD AUTO: 33.6 %
MAGNESIUM SERPL-MCNC: 2 MG/DL (ref 1.6–2.4)
MCH RBC QN AUTO: 31.5 PG (ref 26–33)
MCHC RBC AUTO-ENTMCNC: 34.1 GM/DL (ref 32.2–35.5)
MCV RBC AUTO: 92.4 FL (ref 80–94)
MONOCYTES ABSOLUTE: 0.4 THOU/MM3 (ref 0.4–1.3)
MONOCYTES NFR BLD AUTO: 14.1 %
NEUTROPHILS ABSOLUTE: 1.4 THOU/MM3 (ref 1.8–7.7)
NEUTROPHILS NFR BLD AUTO: 47.6 %
NRBC BLD AUTO-RTO: 0 /100 WBC
OSMOLALITY SERPL CALC.SUM OF ELEC: 275.2 MOSMOL/KG (ref 275–300)
PLATELET # BLD AUTO: 76 THOU/MM3 (ref 130–400)
PMV BLD AUTO: 11 FL (ref 9.4–12.4)
POTASSIUM SERPL-SCNC: 4.6 MEQ/L (ref 3.5–5.2)
PROT SERPL-MCNC: 5.7 G/DL (ref 6.1–8)
RBC # BLD AUTO: 4.99 MILL/MM3 (ref 4.7–6.1)
SCAN OF BLOOD SMEAR: NORMAL
SODIUM SERPL-SCNC: 136 MEQ/L (ref 135–145)
TROPONIN, HIGH SENSITIVITY: 14 NG/L (ref 0–12)
WBC # BLD AUTO: 3 THOU/MM3 (ref 4.8–10.8)

## 2024-08-02 PROCEDURE — 82140 ASSAY OF AMMONIA: CPT

## 2024-08-02 PROCEDURE — 85025 COMPLETE CBC W/AUTO DIFF WBC: CPT

## 2024-08-02 PROCEDURE — 85610 PROTHROMBIN TIME: CPT

## 2024-08-02 PROCEDURE — 74177 CT ABD & PELVIS W/CONTRAST: CPT

## 2024-08-02 PROCEDURE — 76376 3D RENDER W/INTRP POSTPROCES: CPT

## 2024-08-02 PROCEDURE — 72125 CT NECK SPINE W/O DYE: CPT

## 2024-08-02 PROCEDURE — 6360000004 HC RX CONTRAST MEDICATION

## 2024-08-02 PROCEDURE — 70450 CT HEAD/BRAIN W/O DYE: CPT

## 2024-08-02 PROCEDURE — 93005 ELECTROCARDIOGRAM TRACING: CPT | Performed by: PHYSICIAN ASSISTANT

## 2024-08-02 PROCEDURE — 80053 COMPREHEN METABOLIC PANEL: CPT

## 2024-08-02 PROCEDURE — 99285 EMERGENCY DEPT VISIT HI MDM: CPT

## 2024-08-02 PROCEDURE — 71260 CT THORAX DX C+: CPT

## 2024-08-02 PROCEDURE — 82077 ASSAY SPEC XCP UR&BREATH IA: CPT

## 2024-08-02 PROCEDURE — 71045 X-RAY EXAM CHEST 1 VIEW: CPT

## 2024-08-02 PROCEDURE — 36415 COLL VENOUS BLD VENIPUNCTURE: CPT

## 2024-08-02 PROCEDURE — 84484 ASSAY OF TROPONIN QUANT: CPT

## 2024-08-02 PROCEDURE — 83735 ASSAY OF MAGNESIUM: CPT

## 2024-08-02 RX ADMIN — IOPAMIDOL 80 ML: 755 INJECTION, SOLUTION INTRAVENOUS at 18:15

## 2024-08-02 ASSESSMENT — PAIN - FUNCTIONAL ASSESSMENT: PAIN_FUNCTIONAL_ASSESSMENT: 0-10

## 2024-08-02 ASSESSMENT — PAIN DESCRIPTION - LOCATION: LOCATION: BACK

## 2024-08-02 ASSESSMENT — PAIN SCALES - GENERAL: PAINLEVEL_OUTOF10: 6

## 2024-08-02 ASSESSMENT — PAIN DESCRIPTION - PAIN TYPE: TYPE: CHRONIC PAIN

## 2024-08-02 ASSESSMENT — PAIN DESCRIPTION - ONSET: ONSET: ON-GOING

## 2024-08-02 ASSESSMENT — PAIN DESCRIPTION - FREQUENCY: FREQUENCY: CONTINUOUS

## 2024-08-02 NOTE — TELEPHONE ENCOUNTER
Mrs. Turner called and left message for someone to call her back.    Returned phone call to Mrs. Turner.  She was wanting to know what to expect from the chemo medication he started two weeks ago.  He has been urinating himself, sleep walking, muttering that don't under stand him, taking medication in the middle of the night and other things he would normally not do.    Urine is thick and dark color.  He is yellow around the eyes.     Spoke with Dr. Gutierrez and he stated, \" patient stop taking medication and go to ER.  Dr. Gutierrez  felt it could be his liver due to his illness.  Also, gave them the option if they didn't want to go to ER he could be seen Monday with one our doctors.    Mirna replied, \"she understood and would take him to ER.\"

## 2024-08-02 NOTE — ED TRIAGE NOTES
Pt presents to the ED with c/o weakness and fatigue that has been going on for 3 weeks. Pt family states over the past few days he has had trouble ambulating. Pt family also states that the pt has been receiving chemotherapy for the past 3 weeks to treat liver cancer. Pt is alert and oriented x4. EKG complete. VSS.

## 2024-08-02 NOTE — ED NOTES
Pt vitals collected. Pt resting in bed with eyes closed. Pt family denies any needs at this time. Pt respirations easy and unlabored.

## 2024-08-02 NOTE — ED NOTES
Pt vitals collected. Pt denies any needs at this time. Pt respirations easy and unlabored. Call light in reach.

## 2024-08-02 NOTE — ED NOTES
Pt vitals collected. Pt denies any needs at this time. Pt informed of need for urine sample at this time. Pt respirations easy and unlabored.

## 2024-08-02 NOTE — ED NOTES
Pt vitals collected. Pt denies any needs at this time. Pt family provided with a blanket. Pt respirations easy and unlabored.

## 2024-08-03 NOTE — ED PROVIDER NOTES
ATTENDING NOTE:    I supervised and discussed the history, physical exam and the management of this patient with the resident. I reviewed the resident's note and agree with the documented findings and plan of care.  Please see my additional note.    Patient brought to the emergency department by family.  He started on a chemotherapy medication in June but due to some side effects of confusion and altered mental status had to stop it briefly.  Since then he has restarted it.  He was having some insomnia recently, saw his oncologist and also was started on zolpidem.  Since being back on his chemo medication and the zolpidem he has been sleepwalking, muttering to himself.  Wife thinks that he is taking his medications inappropriately, he is in charge of his own medicines and is not monitored while taking them.  He did have a fall yesterday while he fell onto his back and his buttocks.  He has had some fatigue and decreased appetite.  Has has been intermittently sleepwalking and muttering, this is not constant. Currently he is at his baseline in the ED, no confusion or altered mental status. Labs and imaging, studies reviewed.    I personally saw and examined the patient.  I have reviewed and agree with the resident's findings, including all diagnostic interpretations and treatment plans as written.  I was present for the key portion of any procedures performed and the inclusive time noted in any critical care statement.    Electronically verified by Gloria Tsai MD  08/02/24 9710    
      Condition: condition: fair  Dispo: Discharge to home  DISPOSITION        This transcription was electronically signed. It was dictated by use of voice recognition software and electronically transcribed. The transcription may contain errors not detected in proofreading.

## 2024-08-03 NOTE — ED NOTES
Pt vitals collected. Pt and pt family deny any needs at this time. Pt respirations easy and unlabored.

## 2024-08-03 NOTE — DISCHARGE INSTRUCTIONS
You were seen today in the emergency department because of fatigue and fall. Please stop the zolpidem until you see your primary care physician on Monday to evaluate your medication and organized it, also do not try to take overdose of your medication, please if your symptoms come back or worsening please come back to the emergency department as soon as possible. Please try to eat and drink fluid to be able to walk and getting manipulate during the day.

## 2024-08-16 ENCOUNTER — HOSPITAL ENCOUNTER (OUTPATIENT)
Dept: MRI IMAGING | Age: 74
Discharge: HOME | End: 2024-08-16
Attending: INTERNAL MEDICINE
Payer: OTHER GOVERNMENT

## 2024-08-16 DIAGNOSIS — C22.0 HEPATOCELLULAR CARCINOMA IN ADULT (HCC): ICD-10-CM

## 2024-08-16 PROCEDURE — 74183 MRI ABD W/O CNTR FLWD CNTR: CPT

## 2024-08-16 PROCEDURE — 6360000004 HC RX CONTRAST MEDICATION: Performed by: INTERNAL MEDICINE

## 2024-08-16 PROCEDURE — A9579 GAD-BASE MR CONTRAST NOS,1ML: HCPCS | Performed by: INTERNAL MEDICINE

## 2024-08-16 RX ADMIN — GADOTERIDOL 16 ML: 279.3 INJECTION, SOLUTION INTRAVENOUS at 13:44

## 2024-08-21 NOTE — TELEPHONE ENCOUNTER
Brittney Quinn with [facility] said the script was wrote for a a quantity of 30 but it needs to be a 30 day supply. The script is for a 10 day supply with a qty of 30. It would need to be qty 90 for 30 since they are taking it 3 times a day.     Please advise.

## 2024-08-23 ENCOUNTER — OFFICE VISIT (OUTPATIENT)
Dept: ONCOLOGY | Age: 74
End: 2024-08-23
Payer: OTHER GOVERNMENT

## 2024-08-23 ENCOUNTER — HOSPITAL ENCOUNTER (OUTPATIENT)
Dept: INFUSION THERAPY | Age: 74
Discharge: HOME | End: 2024-08-23
Payer: OTHER GOVERNMENT

## 2024-08-23 VITALS
DIASTOLIC BLOOD PRESSURE: 70 MMHG | SYSTOLIC BLOOD PRESSURE: 108 MMHG | RESPIRATION RATE: 16 BRPM | HEART RATE: 74 BPM | OXYGEN SATURATION: 96 % | TEMPERATURE: 97.9 F

## 2024-08-23 VITALS
OXYGEN SATURATION: 96 % | RESPIRATION RATE: 16 BRPM | DIASTOLIC BLOOD PRESSURE: 70 MMHG | WEIGHT: 181.2 LBS | HEIGHT: 69 IN | HEART RATE: 74 BPM | BODY MASS INDEX: 26.84 KG/M2 | TEMPERATURE: 97.9 F | SYSTOLIC BLOOD PRESSURE: 108 MMHG

## 2024-08-23 DIAGNOSIS — C22.0 HEPATOCELLULAR CARCINOMA IN ADULT (HCC): ICD-10-CM

## 2024-08-23 DIAGNOSIS — C22.0 HEPATOCELLULAR CARCINOMA IN ADULT (HCC): Primary | ICD-10-CM

## 2024-08-23 LAB
AFP SERPL-MCNC: 335 UG/L
ALBUMIN SERPL BCG-MCNC: 2.7 G/DL (ref 3.5–5.1)
ALP SERPL-CCNC: 270 U/L (ref 38–126)
ALT SERPL W/O P-5'-P-CCNC: 47 U/L (ref 11–66)
AST SERPL-CCNC: 104 U/L (ref 5–40)
BASOPHILS ABSOLUTE: 0 THOU/MM3 (ref 0–0.1)
BASOPHILS NFR BLD AUTO: 1 % (ref 0–3)
BILIRUB CONJ SERPL-MCNC: 0.9 MG/DL (ref 0.1–13.8)
BILIRUB SERPL-MCNC: 1.5 MG/DL (ref 0.3–1.2)
BUN BLDP-MCNC: 16 MG/DL (ref 8–26)
CHLORIDE BLD-SCNC: 103 MEQ/L (ref 98–109)
CREAT BLD-MCNC: 0.9 MG/DL (ref 0.5–1.2)
EOSINOPHIL NFR BLD AUTO: 3 % (ref 0–4)
EOSINOPHILS ABSOLUTE: 0.1 THOU/MM3 (ref 0–0.4)
ERYTHROCYTE [DISTWIDTH] IN BLOOD BY AUTOMATED COUNT: 19.5 % (ref 11.5–14.5)
GFR SERPL CREATININE-BSD FRML MDRD: 89 ML/MIN/1.73M2
GLUCOSE BLD-MCNC: 108 MG/DL (ref 70–108)
HCT VFR BLD AUTO: 43.1 % (ref 42–52)
HGB BLD-MCNC: 14.8 GM/DL (ref 14–18)
IMMATURE GRANULOCYTES %: 0 %
IMMATURE GRANULOCYTES ABSOLUTE: 0.01 THOU/MM3 (ref 0–0.07)
IONIZED CALCIUM, WHOLE BLOOD: 1.18 MMOL/L (ref 1.12–1.32)
LYMPHOCYTES ABSOLUTE: 0.7 THOU/MM3 (ref 1–4.8)
LYMPHOCYTES NFR BLD AUTO: 26 % (ref 15–47)
MCH RBC QN AUTO: 32.3 PG (ref 26–33)
MCHC RBC AUTO-ENTMCNC: 34.3 GM/DL (ref 32.2–35.5)
MCV RBC AUTO: 94 FL (ref 80–94)
MONOCYTES ABSOLUTE: 0.5 THOU/MM3 (ref 0.4–1.3)
MONOCYTES NFR BLD AUTO: 16 % (ref 0–12)
NEUTROPHILS ABSOLUTE: 1.5 THOU/MM3 (ref 1.8–7.7)
NEUTROPHILS NFR BLD AUTO: 55 % (ref 43–75)
PLATELET # BLD AUTO: 68 THOU/MM3 (ref 130–400)
PMV BLD AUTO: 10.7 FL (ref 9.4–12.4)
POTASSIUM BLD-SCNC: 3.9 MEQ/L (ref 3.5–4.9)
PROT SERPL-MCNC: 5.6 G/DL (ref 6.1–8)
RBC # BLD AUTO: 4.58 MILL/MM3 (ref 4.7–6.1)
SODIUM BLD-SCNC: 138 MEQ/L (ref 138–146)
TOTAL CO2, WHOLE BLOOD: 26 MEQ/L (ref 23–33)
WBC # BLD AUTO: 2.8 THOU/MM3 (ref 4.8–10.8)

## 2024-08-23 PROCEDURE — 36415 COLL VENOUS BLD VENIPUNCTURE: CPT

## 2024-08-23 PROCEDURE — 1123F ACP DISCUSS/DSCN MKR DOCD: CPT | Performed by: INTERNAL MEDICINE

## 2024-08-23 PROCEDURE — 99214 OFFICE O/P EST MOD 30 MIN: CPT | Performed by: INTERNAL MEDICINE

## 2024-08-23 PROCEDURE — 85025 COMPLETE CBC W/AUTO DIFF WBC: CPT

## 2024-08-23 PROCEDURE — 82105 ALPHA-FETOPROTEIN SERUM: CPT

## 2024-08-23 PROCEDURE — 80076 HEPATIC FUNCTION PANEL: CPT

## 2024-08-23 PROCEDURE — 80047 BASIC METABLC PNL IONIZED CA: CPT

## 2024-08-23 PROCEDURE — 99211 OFF/OP EST MAY X REQ PHY/QHP: CPT

## 2024-08-23 RX ORDER — MIRTAZAPINE 15 MG/1
15 TABLET, FILM COATED ORAL NIGHTLY
Qty: 30 TABLET | Refills: 3 | Status: SHIPPED | OUTPATIENT
Start: 2024-08-23

## 2024-08-23 NOTE — PROGRESS NOTES
[facility] [address], [address]  [facility] [address]  Dept: [phone]  Dept Fax: [phone] Loc: [phone]      Visit Date:8/23/2024     Dann Turner is a 73 y.o. male who presents today for:   Chief Complaint   Patient presents with    Follow-up     Hepatocellular carcinoma in adult (        HPI:   Dann Turner is a 73 y.o. male referred to Hematology/Oncology clinic for evaluation of hepatocellular carcinoma.  The patient's history goes back to 2021 when he was found to have a 6-1/2 cm lesion on MRI consistent with hepatocellular carcinoma in the background of cirrhosis.  He did have a biopsy of the liver in 2020 and this demonstrated cirrhosis but no evidence of malignancy.  He had elevation of his alpha-fetoprotein, was seen at the [facility],and because of this had embolization of the lesion in the liver.  All of his subsequent there treatment has been at the [facility]. He subsequently has had multiple liver directed therapies for progression including Yterrium 90 and trans arterial hepatic embolization (TACE).  Most recently on an MRI in March demonstrated new lesions that were not amenable to local therapy.  Of note the patient does not have histological confirmation of disease and has not had tumor genomic sequencing.  Follow-up MRI has demonstrated progression of discrete areas in the liver.  There is a 1.3 x 1 cm lesion in segment 7 there is a 1.2 x 1.1 cm lesion in segment 6 there is a 2 x 1.4 cm area in segment 3.  1.7 x 1.4 cm in segment 3 as well and a another 1.2 x 1 cm area in segment 3.  All are consistent with hepatocellular carcinoma.  In addition his alpha-fetoprotein has increased to 195 from 15.2 in August 2023.  Liver biopsy was positive for recurrence of hepatocellular carcinoma and genomic sequencing with foundation 1 liquid CDX demonstrated an elevated tumor fraction and an amplification of FGF 19 and CCND1.  He was TMB below 10 and

## 2024-08-26 ENCOUNTER — HOSPITAL ENCOUNTER (EMERGENCY)
Age: 74
Discharge: HOME | End: 2024-08-26
Attending: EMERGENCY MEDICINE
Payer: OTHER GOVERNMENT

## 2024-08-26 ENCOUNTER — TELEPHONE (OUTPATIENT)
Dept: ONCOLOGY | Age: 74
End: 2024-08-26

## 2024-08-26 VITALS
DIASTOLIC BLOOD PRESSURE: 72 MMHG | SYSTOLIC BLOOD PRESSURE: 106 MMHG | WEIGHT: 181 LBS | BODY MASS INDEX: 26.81 KG/M2 | OXYGEN SATURATION: 95 % | RESPIRATION RATE: 14 BRPM | TEMPERATURE: 97.5 F | HEIGHT: 69 IN | HEART RATE: 64 BPM

## 2024-08-26 DIAGNOSIS — K11.7 SIALORRHEA: Primary | ICD-10-CM

## 2024-08-26 LAB
ALBUMIN SERPL BCP-MCNC: 2.1 GM/DL (ref 3.4–5)
ALP SERPL-CCNC: 297 U/L (ref 46–116)
ALT SERPL W P-5'-P-CCNC: 71 U/L (ref 14–63)
ANION GAP SERPL CALC-SCNC: 7 MEQ/L (ref 8–16)
AST SERPL W P-5'-P-CCNC: 122 U/L (ref 15–37)
BILIRUB SERPL-MCNC: 1.6 MG/DL (ref 0.2–1)
BUN SERPL-MCNC: 15 MG/DL (ref 7–18)
CALCIUM SERPL-MCNC: 8.5 MG/DL (ref 8.5–10.1)
CHLORIDE SERPL-SCNC: 104 MEQ/L (ref 98–107)
CO2 SERPL-SCNC: 28 MEQ/L (ref 21–32)
CREAT SERPL-MCNC: 1.2 MG/DL (ref 0.6–1.3)
GFR SERPL CREATININE-BSD FRML MDRD: 63 ML/MIN/1.73M2
GLUCOSE SERPL-MCNC: 133 MG/DL (ref 74–106)
HCT VFR BLD CALC: 41.8 % (ref 42–52)
HEMOGLOBIN: 14.1 GM/DL (ref 14–18)
MAGNESIUM SERPL-MCNC: 1.6 MG/DL (ref 1.8–2.4)
MCH RBC QN AUTO: 32.3 PG (ref 26–32)
MCHC RBC AUTO-ENTMCNC: 33.7 GM/DL (ref 31–35)
MCV RBC AUTO: 95.9 FL (ref 80–94)
PDW BLD-RTO: 19.7 % (ref 11.5–14.9)
PLATELET # BLD AUTO: 75 THOU/MM3 (ref 130–400)
PMV BLD AUTO: 10.7 FL (ref 9.4–12.4)
POTASSIUM SERPL-SCNC: 3.7 MEQ/L (ref 3.5–5.1)
PROT SERPL-MCNC: 5.9 GM/DL (ref 6.4–8.2)
RBC # BLD: 4.36 MILL/MM3 (ref 4.5–6.1)
SODIUM SERPL-SCNC: 139 MEQ/L (ref 136–145)
WBC # BLD: 2.8 THOU/MM3 (ref 4.8–10.8)

## 2024-08-26 PROCEDURE — 85025 COMPLETE CBC W/AUTO DIFF WBC: CPT

## 2024-08-26 PROCEDURE — 99284 EMERGENCY DEPT VISIT MOD MDM: CPT

## 2024-08-26 PROCEDURE — 83735 ASSAY OF MAGNESIUM: CPT

## 2024-08-26 PROCEDURE — 6360000002 HC RX W HCPCS: Performed by: EMERGENCY MEDICINE

## 2024-08-26 PROCEDURE — 80053 COMPREHEN METABOLIC PANEL: CPT

## 2024-08-26 PROCEDURE — 96374 THER/PROPH/DIAG INJ IV PUSH: CPT

## 2024-08-26 RX ORDER — GLYCOPYRROLATE 1 MG/1
1 TABLET ORAL 3 TIMES DAILY
Qty: 30 TABLET | Refills: 0 | Status: SHIPPED | OUTPATIENT
Start: 2024-08-26

## 2024-08-26 RX ORDER — BENZTROPINE MESYLATE 1 MG/ML
1 INJECTION, SOLUTION INTRAMUSCULAR; INTRAVENOUS ONCE
Status: COMPLETED | OUTPATIENT
Start: 2024-08-26 | End: 2024-08-26

## 2024-08-26 RX ADMIN — BENZTROPINE MESYLATE 1 MG: 1 INJECTION INTRAMUSCULAR; INTRAVENOUS at 12:43

## 2024-08-26 ASSESSMENT — PAIN - FUNCTIONAL ASSESSMENT
PAIN_FUNCTIONAL_ASSESSMENT: NONE - DENIES PAIN
PAIN_FUNCTIONAL_ASSESSMENT: NONE - DENIES PAIN

## 2024-08-26 NOTE — TELEPHONE ENCOUNTER
Called pt's wife, instructed her to take him to the ER immediately. Pt's wife stated understanding. He will hold lenvatinib and I advised them to call our office back when he is discharged.

## 2024-08-26 NOTE — ED TRIAGE NOTES
Arrives to ER for the evaluation of drooling for 3-4 days.  Was instructed by Dr Gutierrez (Oncologist) to be seen and evaluated.  Ambulatory.  Wife Mirna with patient and reports that patient has been unsteady.  Was started on Remeron and Zoloft 2 weeks ago per wife.  Also prescribed Zolpidem but wife quit giving it to patient 1 week ago.  Patient denies headache, sore throat, cough, SOB, nausea, numbness, tingling, runny nose, double vision, difficulty swallowing, chest pain.  Did have blurred vision but is resolved.  Alert, Ox4.  NIH negative.  Resting on cot.  Waiting provider to assess for orders.

## 2024-08-26 NOTE — ED PROVIDER NOTES
[facility]  [address] [place] [id number]  Phone: [phone]  EMERGENCY DEPARTMENT ENCOUNTER      Pt Name: Dann Turner  MRN: [id number]  Birthdate 1950  Date of evaluation: 8/26/2024  Provider: Vitor Hayes MD    CHIEF COMPLAINT       Chief Complaint   Patient presents with    Drooling     3-4 Days- Sent by Dr Davies (Oncologist)     Blurred Vision         HISTORY OF PRESENT ILLNESS      Dann Turner is a 73 y.o. male who presents to the emergency department with above noted complaint.  Patient presents with some drooling.  Actually patient has had more excessive salivation.  He is able to handle secretions and swallow.  Seems a bit more accessible for him or he forgets to swallow.  He denies other symptoms at this time        REVIEW OF SYSTEMS     Positive for excessive salivation  Review of Systems  All systems negative except as marked.     PAST MEDICAL HISTORY     Past Medical History:   Diagnosis Date    Cancer (HCC)     liver    Hyperlipidemia     Hypertension          SURGICAL HISTORY       Past Surgical History:   Procedure Laterality Date    CT NEEDLE BIOPSY LIVER PERCUTANEOUS  5/31/2024    CT NEEDLE BIOPSY LIVER PERCUTANEOUS 5/31/2024 [facility] CT SCAN    NERVE SURGERY      pinched nerve in left arm    OTHER SURGICAL HISTORY  02/02/2024    \"chemo embolization\" of liver tumor         CURRENT MEDICATIONS       Previous Medications    ATORVASTATIN (LIPITOR) 20 MG TABLET    Take by mouth    BLOOD PRESSURE    by Does not apply route. Patient takes medication for hyperstension     CETIRIZINE (ZYRTEC) 10 MG TABLET    Take 1 tablet by mouth at bedtime    DILTIAZEM (CARDIZEM) 120 MG TABLET    Take 1 tablet by mouth daily    ESCITALOPRAM (LEXAPRO) 5 MG TABLET    Take 1 tablet by mouth daily    FLUTICASONE (FLONASE) 50 MCG/ACT NASAL SPRAY    1 spray by Each Nostril route in the morning.    LENVATINIB, 12 MG DAILY DOSE, 3 X 4 MG CPPK    Take 12 mg by mouth daily

## 2024-08-26 NOTE — TELEPHONE ENCOUNTER
Dr. Gutierrez pt, was just seen in clinic by Dr. Ryan on Friday, 8/23. Pt's wife calling in to report new symptoms over the weekend of thick secretions, drooling and difficulty swallowing. She said \"he thinks he might have had a stroke in his sleep\" because he woke up and has been drooling a lot. I asked her to have him smile, to which she said he does not have any facial asymmetry, denies slurred speech, loss of strength, balance issues etc. Patient did start taking a new medication over the weekend, mirtazapine.

## 2024-08-26 NOTE — TELEPHONE ENCOUNTER
Patient's wife is calling back in, said that she took pt to the ER and neuro work up was negative for stroke. They told him to discontinue mirtazapine and gave him medication to help with the secretions and sent him home. Wife feels that patient is very flat and anxious as well and wants to know if zoloft dose can be increased. I suggested she [facility]. She is wondering if patient should continue lenvatinib and if he needs a sooner follow up than 9/25/24.

## 2024-08-27 NOTE — TELEPHONE ENCOUNTER
Called patient and went straight to voicemail, left message to instruct him to continue taking lenvatinib and to call with any questions or concerns before his appointment on 9/25/24. Call back number provided.

## 2024-09-02 LAB
ANISOCYTOSIS BLD QL SMEAR: ABNORMAL
BASOPHILS # BLD: 0.7 % (ref 0–3)
BASOPHILS ABSOLUTE: 0 THOU/MM3 (ref 0–0.1)
EOSINOPHILS ABSOLUTE: 0.1 THOU/MM3 (ref 0–0.5)
EOSINOPHILS RELATIVE PERCENT: 4.3 % (ref 0–4)
HCT VFR BLD CALC: 41.8 % (ref 42–52)
HEMOGLOBIN: 14.1 GM/DL (ref 14–18)
IMMATURE GRANS (ABS): 0 THOU/MM3 (ref 0–0.07)
IMMATURE GRANULOCYTES %: 0 %
LYMPHOCYTES # BLD AUTO: 28 % (ref 15–47)
LYMPHOCYTES ABSOLUTE: 0.8 THOU/MM3 (ref 1–4.8)
MACROCYTES BLD QL SMEAR: ABNORMAL
MCH RBC QN AUTO: 32.3 PG (ref 26–32)
MCHC RBC AUTO-ENTMCNC: 33.7 GM/DL (ref 31–35)
MCV RBC AUTO: 95.9 FL (ref 80–94)
MONOCYTES: 0.5 THOU/MM3 (ref 0.3–1.3)
MONOCYTES: 16.8 % (ref 0–12)
NEUTROPHILS ABSOLUTE: 1.4 THOU/MM3 (ref 1.8–7.7)
PDW BLD-RTO: 19.7 % (ref 11.5–14.9)
PLATELET # BLD AUTO: 75 THOU/MM3 (ref 130–400)
PLATELET BLD QL SMEAR: ABNORMAL
PMV BLD AUTO: 10.7 FL (ref 9.4–12.4)
RBC # BLD: 4.36 MILL/MM3 (ref 4.5–6.1)
SCAN OF BLOOD SMEAR: NORMAL
SEG NEUTROPHILS: 50.2 % (ref 43–75)
WBC # BLD: 2.8 THOU/MM3 (ref 4.8–10.8)

## 2024-09-12 NOTE — ED PROVIDER NOTES
Detail Level: Zone
Limb Ataxia:  0 - absent  8.    Sensory:  0 - normal; no sensory loss  9.    Best Language:  0 - no aphasia, normal  10.  Dysarthria:  0 - normal  11.  Extinction and Inattention:  0 - no abnormality    TOTAL:  0     NIH scale is 0.  Patient has no obvious deficits.  He denies any weakness to his right arm currently.  Denies any numbness to his tongue.  His tongue is of normal contour and shape.  No other focal findings noted on exam.  Pulses are intact in the upper and lower extremities.  Based on his symptomology obviously ruling out stroke based on his symptoms necessitated me obtaining a CT of his head CTA of his head and neck.  Labs were obtained EKG was obtained.  Labs reassuring.  CT of the head, CTA of the head and neck all unremarkable.  I cannot exclude TIA as a component of her presentation.  Further follow-up with primary care provider recommended.  Care instructions provided.    CRITICAL CARE:       CONSULTS:      PROCEDURES:  None    FINAL IMPRESSION      1. TIA (transient ischemic attack)          DISPOSITION/PLAN   Home.  Follow-up with primary care provider return if any new motor or sensory changes occur.    PATIENT REFERRED TO:  Elisabeth Valle MD  [address] [place] [address]  [phone]    Call in 1 day  If symptoms worsen, As needed      DISCHARGE MEDICATIONS:  New Prescriptions    No medications on file       (Please note that portions of this note were completed with a voice recognition program.  Efforts were made to edit the dictations but occasionally words are mis-transcribed.)    MD Teresa Holder Edward R, MD  06/25/24 3078

## 2024-09-16 RX ORDER — LENVATINIB 4 MG/1
CAPSULE ORAL
Qty: 90 EACH | Refills: 0 | Status: ACTIVE | OUTPATIENT
Start: 2024-09-16

## 2024-09-25 ENCOUNTER — HOSPITAL ENCOUNTER (OUTPATIENT)
Dept: INFUSION THERAPY | Age: 74
Discharge: HOME | End: 2024-09-25
Payer: OTHER GOVERNMENT

## 2024-09-25 ENCOUNTER — OFFICE VISIT (OUTPATIENT)
Dept: ONCOLOGY | Age: 74
End: 2024-09-25
Payer: OTHER GOVERNMENT

## 2024-09-25 VITALS
BODY MASS INDEX: 27.55 KG/M2 | HEIGHT: 69 IN | RESPIRATION RATE: 20 BRPM | HEART RATE: 59 BPM | DIASTOLIC BLOOD PRESSURE: 99 MMHG | WEIGHT: 186 LBS | OXYGEN SATURATION: 90 % | TEMPERATURE: 97.6 F | SYSTOLIC BLOOD PRESSURE: 135 MMHG

## 2024-09-25 VITALS
HEART RATE: 59 BPM | DIASTOLIC BLOOD PRESSURE: 99 MMHG | SYSTOLIC BLOOD PRESSURE: 135 MMHG | TEMPERATURE: 97.6 F | RESPIRATION RATE: 20 BRPM

## 2024-09-25 DIAGNOSIS — C22.0 HEPATOCELLULAR CARCINOMA IN ADULT (HCC): Primary | ICD-10-CM

## 2024-09-25 DIAGNOSIS — C22.0 HEPATOCELLULAR CARCINOMA IN ADULT (HCC): ICD-10-CM

## 2024-09-25 LAB
ALBUMIN SERPL BCG-MCNC: 2.4 G/DL (ref 3.5–5.1)
ALP SERPL-CCNC: 315 U/L (ref 38–126)
ALT SERPL W/O P-5'-P-CCNC: 60 U/L (ref 11–66)
AST SERPL-CCNC: 135 U/L (ref 5–40)
BASOPHILS ABSOLUTE: 0 THOU/MM3 (ref 0–0.1)
BASOPHILS NFR BLD AUTO: 1 % (ref 0–3)
BILIRUB CONJ SERPL-MCNC: 1.3 MG/DL (ref 0.1–13.8)
BILIRUB SERPL-MCNC: 2.5 MG/DL (ref 0.3–1.2)
BUN BLDP-MCNC: 23 MG/DL (ref 8–26)
CHLORIDE BLD-SCNC: 100 MEQ/L (ref 98–109)
CREAT BLD-MCNC: 1.1 MG/DL (ref 0.5–1.2)
EOSINOPHIL NFR BLD AUTO: 5 % (ref 0–4)
EOSINOPHILS ABSOLUTE: 0.2 THOU/MM3 (ref 0–0.4)
ERYTHROCYTE [DISTWIDTH] IN BLOOD BY AUTOMATED COUNT: 19.4 % (ref 11.5–14.5)
GFR SERPL CREATININE-BSD FRML MDRD: 70 ML/MIN/1.73M2
GLUCOSE BLD-MCNC: 102 MG/DL (ref 70–108)
HCT VFR BLD AUTO: 46.5 % (ref 42–52)
HGB BLD-MCNC: 16.1 GM/DL (ref 14–18)
IMMATURE GRANULOCYTES %: 0 %
IMMATURE GRANULOCYTES ABSOLUTE: 0.01 THOU/MM3 (ref 0–0.07)
IONIZED CALCIUM, WHOLE BLOOD: 1.16 MMOL/L (ref 1.12–1.32)
LYMPHOCYTES ABSOLUTE: 1 THOU/MM3 (ref 1–4.8)
LYMPHOCYTES NFR BLD AUTO: 26 % (ref 15–47)
MCH RBC QN AUTO: 32.7 PG (ref 26–33)
MCHC RBC AUTO-ENTMCNC: 34.6 GM/DL (ref 32.2–35.5)
MCV RBC AUTO: 94 FL (ref 80–94)
MONOCYTES ABSOLUTE: 0.5 THOU/MM3 (ref 0.4–1.3)
MONOCYTES NFR BLD AUTO: 13 % (ref 0–12)
NEUTROPHILS ABSOLUTE: 2.1 THOU/MM3 (ref 1.8–7.7)
NEUTROPHILS NFR BLD AUTO: 55 % (ref 43–75)
PLATELET # BLD AUTO: 51 THOU/MM3 (ref 130–400)
PMV BLD AUTO: 9.4 FL (ref 9.4–12.4)
POTASSIUM BLD-SCNC: 5 MEQ/L (ref 3.5–4.9)
PROT SERPL-MCNC: 5 G/DL (ref 6.1–8)
RBC # BLD AUTO: 4.93 MILL/MM3 (ref 4.7–6.1)
SODIUM BLD-SCNC: 132 MEQ/L (ref 138–146)
TOTAL CO2, WHOLE BLOOD: 24 MEQ/L (ref 23–33)
WBC # BLD AUTO: 3.8 THOU/MM3 (ref 4.8–10.8)

## 2024-09-25 PROCEDURE — 99214 OFFICE O/P EST MOD 30 MIN: CPT | Performed by: INTERNAL MEDICINE

## 2024-09-25 PROCEDURE — 80076 HEPATIC FUNCTION PANEL: CPT

## 2024-09-25 PROCEDURE — 80047 BASIC METABLC PNL IONIZED CA: CPT

## 2024-09-25 PROCEDURE — 82105 ALPHA-FETOPROTEIN SERUM: CPT

## 2024-09-25 PROCEDURE — 99211 OFF/OP EST MAY X REQ PHY/QHP: CPT

## 2024-09-25 PROCEDURE — 85025 COMPLETE CBC W/AUTO DIFF WBC: CPT

## 2024-09-25 PROCEDURE — 36415 COLL VENOUS BLD VENIPUNCTURE: CPT

## 2024-09-25 PROCEDURE — 1123F ACP DISCUSS/DSCN MKR DOCD: CPT | Performed by: INTERNAL MEDICINE

## 2024-09-27 LAB — AFP SERPL-MCNC: 503 UG/L

## 2024-10-01 ENCOUNTER — CLINICAL DOCUMENTATION (OUTPATIENT)
Dept: CASE MANAGEMENT | Age: 74
End: 2024-10-01

## 2024-10-01 NOTE — PROGRESS NOTES
Marcelo call to [facility] clinic, spoke with Levi.   [facility] order & [facility] at: [phone] for Dr. Valle review & approval for [facility].      Marcelo call to pt's spouse to update on the above.

## 2024-10-03 ENCOUNTER — OFFICE VISIT (OUTPATIENT)
Dept: PALLATIVE CARE | Age: 74
End: 2024-10-03
Payer: OTHER GOVERNMENT

## 2024-10-03 VITALS
HEART RATE: 77 BPM | BODY MASS INDEX: 27.85 KG/M2 | RESPIRATION RATE: 17 BRPM | SYSTOLIC BLOOD PRESSURE: 106 MMHG | WEIGHT: 188 LBS | OXYGEN SATURATION: 95 % | DIASTOLIC BLOOD PRESSURE: 66 MMHG | HEIGHT: 69 IN

## 2024-10-03 DIAGNOSIS — C22.0 HEPATOCELLULAR CARCINOMA IN ADULT (HCC): Primary | ICD-10-CM

## 2024-10-03 DIAGNOSIS — Z51.5 PALLIATIVE CARE PATIENT: ICD-10-CM

## 2024-10-03 DIAGNOSIS — R53.81 PHYSICAL DECONDITIONING: ICD-10-CM

## 2024-10-03 DIAGNOSIS — K74.60 HEPATIC CIRRHOSIS, UNSPECIFIED HEPATIC CIRRHOSIS TYPE, UNSPECIFIED WHETHER ASCITES PRESENT (HCC): ICD-10-CM

## 2024-10-03 PROCEDURE — G8484 FLU IMMUNIZE NO ADMIN: HCPCS | Performed by: STUDENT IN AN ORGANIZED HEALTH CARE EDUCATION/TRAINING PROGRAM

## 2024-10-03 PROCEDURE — 99205 OFFICE O/P NEW HI 60 MIN: CPT | Performed by: STUDENT IN AN ORGANIZED HEALTH CARE EDUCATION/TRAINING PROGRAM

## 2024-10-03 PROCEDURE — 1036F TOBACCO NON-USER: CPT | Performed by: STUDENT IN AN ORGANIZED HEALTH CARE EDUCATION/TRAINING PROGRAM

## 2024-10-03 PROCEDURE — G8427 DOCREV CUR MEDS BY ELIG CLIN: HCPCS | Performed by: STUDENT IN AN ORGANIZED HEALTH CARE EDUCATION/TRAINING PROGRAM

## 2024-10-03 PROCEDURE — 3017F COLORECTAL CA SCREEN DOC REV: CPT | Performed by: STUDENT IN AN ORGANIZED HEALTH CARE EDUCATION/TRAINING PROGRAM

## 2024-10-03 PROCEDURE — 1123F ACP DISCUSS/DSCN MKR DOCD: CPT | Performed by: STUDENT IN AN ORGANIZED HEALTH CARE EDUCATION/TRAINING PROGRAM

## 2024-10-03 PROCEDURE — G8419 CALC BMI OUT NRM PARAM NOF/U: HCPCS | Performed by: STUDENT IN AN ORGANIZED HEALTH CARE EDUCATION/TRAINING PROGRAM

## 2024-10-03 RX ORDER — ACETAMINOPHEN 500 MG
1000 TABLET ORAL 2 TIMES DAILY
COMMUNITY

## 2024-10-03 NOTE — PATIENT INSTRUCTIONS
Continue current plan of care  Change code status to DNR/DNI/No ICU  POLST form completed today and reflects patient's wishes  [place] Portable DNR completed today  Healthcare POA completed today  Will order hospital bed today  Please call the office if your symptoms worsen or if you were to develop new symptoms  Please call the palliative care office when you need refills

## 2024-10-03 NOTE — PROGRESS NOTES
Family member(s) - Son John    ACP documents available in EMR/Paper Chart: None    Healthcare Power of /Healthcare Surrogate Decision Makers: Patient does not have any healthcare power of  documentation in the EMR and/or present in the physical chart. They would like to create healthcare POA paperwork. The identified healthcare power of /surrogate decision makers are as follows:     Primary Decision Maker: Mirna Turner - Spouse - [phone]    Secondary Decision Maker: Dann Turner III - Child - [phone]       Primary Palliative Diagnosis(es): The primary encounter diagnosis was Hepatocellular carcinoma in adult (HCC). Diagnoses of Hepatic cirrhosis, unspecified hepatic cirrhosis type, unspecified whether ascites present (HCC), Physical deconditioning, and Palliative care patient were also pertinent to this visit.     Conversation Summary: Please refer to palliative care note for full goals of care discussion.  Briefly, They would not want cardiopulmonary resuscitation in the event of cardiac arrest nor would they want to be intubated and mechanically ventilated. They would want to attempt to restore function while avoiding intensive care and aggressive resuscitation efforts such as mechanical ventilation, defibrillation, cardioversion and pressors. Treatment may include non-invasive positive airway pressure, antibiotics and IV fluids as indicated. They would want transfer to a hospital if treatment needs cannot be met in current location. This is consistent with a code status of Do Not Resuscitate/Do Not Intubate/No ICU (Limited x4, No ICU). They desire no artificial means of nutrition.    Code Status:  Limited Limited Code details: Intubation/Re-intubation No; Defibrillation/Cardioversion No; Chest Compressions No; Resuscitative Medications No; Other No Comment    Outcomes: Please refer to palliative care note from for full assessment and plan.  Briefly, He would like more time to

## 2024-10-10 ENCOUNTER — HOSPITAL ENCOUNTER (OUTPATIENT)
Age: 74
Discharge: HOME | End: 2024-10-10
Attending: INTERNAL MEDICINE
Payer: OTHER GOVERNMENT

## 2024-10-10 ENCOUNTER — TELEPHONE (OUTPATIENT)
Dept: ONCOLOGY | Age: 74
End: 2024-10-10

## 2024-10-10 ENCOUNTER — HOSPITAL ENCOUNTER (OUTPATIENT)
Dept: MRI IMAGING | Age: 74
Discharge: HOME | End: 2024-10-10
Attending: INTERNAL MEDICINE
Payer: OTHER GOVERNMENT

## 2024-10-10 DIAGNOSIS — C22.0 HEPATOCELLULAR CARCINOMA IN ADULT (HCC): ICD-10-CM

## 2024-10-10 DIAGNOSIS — R32 URINARY INCONTINENCE, UNSPECIFIED TYPE: Primary | ICD-10-CM

## 2024-10-10 DIAGNOSIS — R31.9 HEMATURIA, UNSPECIFIED TYPE: ICD-10-CM

## 2024-10-10 DIAGNOSIS — Z51.5 PALLIATIVE CARE PATIENT: ICD-10-CM

## 2024-10-10 DIAGNOSIS — R10.9 ABDOMINAL PAIN, UNSPECIFIED ABDOMINAL LOCATION: ICD-10-CM

## 2024-10-10 DIAGNOSIS — G89.3 CANCER RELATED PAIN: ICD-10-CM

## 2024-10-10 LAB
BACTERIA URNS QL MICRO: ABNORMAL /HPF
BILIRUB UR QL STRIP.AUTO: ABNORMAL
CASTS #/AREA URNS LPF: ABNORMAL /LPF
CASTS 2: ABNORMAL /LPF
CHARACTER UR: CLEAR
COLOR, UA: ABNORMAL
CRYSTALS URNS MICRO: ABNORMAL
EPITHELIAL CELLS, UA: ABNORMAL /HPF
GLUCOSE UR QL STRIP.AUTO: NEGATIVE MG/DL
HGB UR QL STRIP.AUTO: NEGATIVE
KETONES UR QL STRIP.AUTO: ABNORMAL
MISCELLANEOUS 2: ABNORMAL
MUCOUS THREADS URNS QL MICRO: ABNORMAL
NITRITE UR QL STRIP: POSITIVE
PH UR STRIP.AUTO: 5.5 [PH] (ref 5–9)
PROT UR STRIP.AUTO-MCNC: 30 MG/DL
RBC URINE: ABNORMAL /HPF
RENAL EPI CELLS #/AREA URNS HPF: ABNORMAL /[HPF]
SP GR UR REFRACT.AUTO: 1.03 (ref 1–1.03)
UROBILINOGEN, URINE: 2 EU/DL (ref 0–1)
WBC #/AREA URNS HPF: ABNORMAL /HPF
WBC #/AREA URNS HPF: ABNORMAL /[HPF]
YEAST LIKE FUNGI URNS QL MICRO: ABNORMAL

## 2024-10-10 PROCEDURE — 6360000004 HC RX CONTRAST MEDICATION: Performed by: INTERNAL MEDICINE

## 2024-10-10 PROCEDURE — 70553 MRI BRAIN STEM W/O & W/DYE: CPT

## 2024-10-10 PROCEDURE — 81001 URINALYSIS AUTO W/SCOPE: CPT

## 2024-10-10 PROCEDURE — A9579 GAD-BASE MR CONTRAST NOS,1ML: HCPCS | Performed by: INTERNAL MEDICINE

## 2024-10-10 RX ORDER — OXYCODONE HYDROCHLORIDE 5 MG/1
5 TABLET ORAL EVERY 6 HOURS PRN
Qty: 120 TABLET | Refills: 0 | Status: SHIPPED | OUTPATIENT
Start: 2024-10-10 | End: 2024-11-09

## 2024-10-10 RX ADMIN — GADOTERIDOL 15 ML: 279.3 INJECTION, SOLUTION INTRAVENOUS at 15:05

## 2024-10-10 NOTE — TELEPHONE ENCOUNTER
Patient's wife is calling in to report new symptoms, patient is now incontinent of urine and the last several times she has changed him he has had a noticeable amount of blood in his urine. He is also experiencing a lot of abdominal pain and dizziness and has not been eating and drinking much (less than two 16 oz water bottles a day). He stopped his lenvatinib 2 weeks ago.

## 2024-10-10 NOTE — TELEPHONE ENCOUNTER
Spoke with pt's wife Mirna. Informed Mirna of new medication for the patients pain. Wife verbalized understanding.

## 2024-10-10 NOTE — TELEPHONE ENCOUNTER
Called the wife back and instructed her to have the patient obtain urine specimen today while he is here for his brain MRI. Wife stated understanding and had no further questions at this time.

## 2024-10-14 ENCOUNTER — HOSPITAL ENCOUNTER (OUTPATIENT)
Dept: MRI IMAGING | Age: 74
Discharge: HOME | End: 2024-10-14
Attending: INTERNAL MEDICINE
Payer: OTHER GOVERNMENT

## 2024-10-14 ENCOUNTER — HOSPITAL ENCOUNTER (OUTPATIENT)
Dept: CT IMAGING | Age: 74
Discharge: HOME | End: 2024-10-14
Attending: INTERNAL MEDICINE
Payer: OTHER GOVERNMENT

## 2024-10-14 ENCOUNTER — OFFICE VISIT (OUTPATIENT)
Dept: ONCOLOGY | Age: 74
End: 2024-10-14
Payer: OTHER GOVERNMENT

## 2024-10-14 ENCOUNTER — CLINICAL DOCUMENTATION (OUTPATIENT)
Dept: CASE MANAGEMENT | Age: 74
End: 2024-10-14

## 2024-10-14 ENCOUNTER — HOSPITAL ENCOUNTER (OUTPATIENT)
Dept: INFUSION THERAPY | Age: 74
Discharge: HOME | End: 2024-10-14
Payer: OTHER GOVERNMENT

## 2024-10-14 VITALS
BODY MASS INDEX: 27.76 KG/M2 | OXYGEN SATURATION: 95 % | HEIGHT: 69 IN | DIASTOLIC BLOOD PRESSURE: 70 MMHG | RESPIRATION RATE: 16 BRPM | HEART RATE: 78 BPM | SYSTOLIC BLOOD PRESSURE: 110 MMHG | TEMPERATURE: 97.6 F

## 2024-10-14 VITALS
RESPIRATION RATE: 16 BRPM | TEMPERATURE: 97.6 F | DIASTOLIC BLOOD PRESSURE: 70 MMHG | SYSTOLIC BLOOD PRESSURE: 110 MMHG | HEART RATE: 78 BPM | OXYGEN SATURATION: 95 %

## 2024-10-14 DIAGNOSIS — C22.0 HEPATOCELLULAR CARCINOMA IN ADULT (HCC): Primary | ICD-10-CM

## 2024-10-14 DIAGNOSIS — E86.0 DEHYDRATION: ICD-10-CM

## 2024-10-14 DIAGNOSIS — C22.0 HEPATOCELLULAR CARCINOMA IN ADULT (HCC): ICD-10-CM

## 2024-10-14 DIAGNOSIS — R53.81 PHYSICAL DEBILITY: ICD-10-CM

## 2024-10-14 PROCEDURE — 71260 CT THORAX DX C+: CPT

## 2024-10-14 PROCEDURE — A9579 GAD-BASE MR CONTRAST NOS,1ML: HCPCS | Performed by: INTERNAL MEDICINE

## 2024-10-14 PROCEDURE — 6360000004 HC RX CONTRAST MEDICATION: Performed by: INTERNAL MEDICINE

## 2024-10-14 PROCEDURE — 1123F ACP DISCUSS/DSCN MKR DOCD: CPT | Performed by: INTERNAL MEDICINE

## 2024-10-14 PROCEDURE — 99214 OFFICE O/P EST MOD 30 MIN: CPT | Performed by: INTERNAL MEDICINE

## 2024-10-14 PROCEDURE — 74183 MRI ABD W/O CNTR FLWD CNTR: CPT

## 2024-10-14 PROCEDURE — 99211 OFF/OP EST MAY X REQ PHY/QHP: CPT

## 2024-10-14 RX ORDER — SODIUM CHLORIDE 0.9 % (FLUSH) 0.9 %
5-40 SYRINGE (ML) INJECTION PRN
Status: CANCELLED | OUTPATIENT
Start: 2024-10-14

## 2024-10-14 RX ORDER — ALBUTEROL SULFATE 90 UG/1
4 INHALANT RESPIRATORY (INHALATION) PRN
Status: CANCELLED | OUTPATIENT
Start: 2024-10-14

## 2024-10-14 RX ORDER — IOPAMIDOL 755 MG/ML
80 INJECTION, SOLUTION INTRAVASCULAR
Status: COMPLETED | OUTPATIENT
Start: 2024-10-14 | End: 2024-10-14

## 2024-10-14 RX ORDER — HEPARIN SODIUM (PORCINE) LOCK FLUSH IV SOLN 100 UNIT/ML 100 UNIT/ML
500 SOLUTION INTRAVENOUS PRN
Status: CANCELLED | OUTPATIENT
Start: 2024-10-14

## 2024-10-14 RX ORDER — DIPHENHYDRAMINE HYDROCHLORIDE 50 MG/ML
50 INJECTION INTRAMUSCULAR; INTRAVENOUS
Status: CANCELLED | OUTPATIENT
Start: 2024-10-14

## 2024-10-14 RX ORDER — ACETAMINOPHEN 325 MG/1
650 TABLET ORAL
Status: CANCELLED | OUTPATIENT
Start: 2024-10-14

## 2024-10-14 RX ORDER — FAMOTIDINE 10 MG/ML
20 INJECTION, SOLUTION INTRAVENOUS
Status: CANCELLED | OUTPATIENT
Start: 2024-10-14

## 2024-10-14 RX ORDER — SODIUM CHLORIDE 9 MG/ML
5-250 INJECTION, SOLUTION INTRAVENOUS PRN
Status: CANCELLED | OUTPATIENT
Start: 2024-10-14

## 2024-10-14 RX ORDER — SODIUM CHLORIDE 9 MG/ML
INJECTION, SOLUTION INTRAVENOUS CONTINUOUS
Status: CANCELLED | OUTPATIENT
Start: 2024-10-14

## 2024-10-14 RX ORDER — EPINEPHRINE 1 MG/ML
0.3 INJECTION, SOLUTION, CONCENTRATE INTRAVENOUS PRN
Status: CANCELLED | OUTPATIENT
Start: 2024-10-14

## 2024-10-14 RX ORDER — ONDANSETRON 2 MG/ML
8 INJECTION INTRAMUSCULAR; INTRAVENOUS
Status: CANCELLED | OUTPATIENT
Start: 2024-10-14

## 2024-10-14 RX ADMIN — GADOTERIDOL 15 ML: 279.3 INJECTION, SOLUTION INTRAVENOUS at 18:11

## 2024-10-14 RX ADMIN — IOPAMIDOL 80 ML: 755 INJECTION, SOLUTION INTRAVENOUS at 17:05

## 2024-10-14 NOTE — PROGRESS NOTES
available including immunotherapy.  However I am very concerned about using immunotherapy with such poor performance status.  He will need to be reevaluated in another 3 months of chemotherapy to see if his performance status is improving or not.  We recommend palliative medicine referral to discuss goal of therapy.    10/14/24: Patient returns to clinic for evaluation.  -He is significantly improved after starting chemotherapy break on 9/25/24.  -I further discussed disease treatments and approaches including restarting systemic treatment with another line of therapy like immunotherapy versus watchful wait/surveillance.  -After a lengthy discussion with the patient and his family, the decision was made to obtain a new baseline scan with abdominal MRI as well as CT chest with contrast.  -After learning about current disease status from the ordered scans, we will further discuss therapeutic approaches with the final decision to be within patient's wishes.  -Of note, the patient was evaluated by palliative team.  He informed them that he still has not finalized his wishes in regards to cancer treatment versus palliative care/hospice.  -In addition, I ordered PRN supportive IV hydration.  -The plan is to reconvene with the patient next week to discuss imaging results and finalize treatment plan.    #Cytopenia  -Patient does have thrombocytopenia and mild reduction of his white count.    -These are unchanged.    -This is related to his underlying hepatic dysfunction.  -Continue to monitor    #Alcohol use  -Patient does still drink a small amount alcohol in the face of his ongoing HCC and cirrhosis.    -Counseled about the importance of quitting alcohol use.    I certify that, based on my findings, the following services are medically necessary home health services for Dann Turner for the following reasons:   -Consult Home Health Aid for assistance with Activities of Daily Living.  -Consult Occupational Therapy for

## 2024-10-14 NOTE — PATIENT INSTRUCTIONS
-Ordered MRI abdomen (liver) and pelvis as well as CT chest w/contrast. Please help arrange ASAP.  -Patient will need home health care for daily life activity help.  -Ordered Hospital bed.  -Return to clinic after imaging.

## 2024-10-15 DIAGNOSIS — N39.0 URINARY TRACT INFECTION WITH HEMATURIA, SITE UNSPECIFIED: ICD-10-CM

## 2024-10-15 DIAGNOSIS — R31.9 URINARY TRACT INFECTION WITH HEMATURIA, SITE UNSPECIFIED: ICD-10-CM

## 2024-10-15 DIAGNOSIS — R32 URINARY INCONTINENCE, UNSPECIFIED TYPE: Primary | ICD-10-CM

## 2024-10-15 RX ORDER — SULFAMETHOXAZOLE AND TRIMETHOPRIM 800; 160 MG/1; MG/1
1 TABLET ORAL 2 TIMES DAILY
Qty: 14 TABLET | Refills: 0 | Status: SHIPPED | OUTPATIENT
Start: 2024-10-15 | End: 2024-10-22

## 2024-10-16 ENCOUNTER — CLINICAL DOCUMENTATION (OUTPATIENT)
Dept: CASE MANAGEMENT | Age: 74
End: 2024-10-16

## 2024-10-16 ENCOUNTER — APPOINTMENT (OUTPATIENT)
Dept: CT IMAGING | Age: 74
End: 2024-10-16
Payer: OTHER GOVERNMENT

## 2024-10-16 ENCOUNTER — APPOINTMENT (OUTPATIENT)
Dept: GENERAL RADIOLOGY | Age: 74
End: 2024-10-16
Payer: OTHER GOVERNMENT

## 2024-10-16 ENCOUNTER — HOSPITAL ENCOUNTER (EMERGENCY)
Age: 74
Discharge: HOME | End: 2024-10-16
Attending: FAMILY MEDICINE
Payer: OTHER GOVERNMENT

## 2024-10-16 VITALS
BODY MASS INDEX: 28.14 KG/M2 | HEART RATE: 70 BPM | DIASTOLIC BLOOD PRESSURE: 78 MMHG | RESPIRATION RATE: 16 BRPM | TEMPERATURE: 97.7 F | SYSTOLIC BLOOD PRESSURE: 124 MMHG | OXYGEN SATURATION: 96 % | WEIGHT: 190 LBS | HEIGHT: 69 IN

## 2024-10-16 DIAGNOSIS — R42 LIGHTHEADEDNESS: ICD-10-CM

## 2024-10-16 DIAGNOSIS — R63.0 DECREASED APPETITE: ICD-10-CM

## 2024-10-16 DIAGNOSIS — N39.0 URINARY TRACT INFECTION WITH HEMATURIA, SITE UNSPECIFIED: Primary | ICD-10-CM

## 2024-10-16 DIAGNOSIS — R31.9 URINARY TRACT INFECTION WITH HEMATURIA, SITE UNSPECIFIED: Primary | ICD-10-CM

## 2024-10-16 DIAGNOSIS — R53.83 OTHER FATIGUE: ICD-10-CM

## 2024-10-16 LAB
ALBUMIN SERPL BCG-MCNC: 2.1 G/DL (ref 3.5–5.1)
ALP SERPL-CCNC: 613 U/L (ref 38–126)
ALT SERPL W/O P-5'-P-CCNC: 70 U/L (ref 11–66)
AMMONIA PLAS-MCNC: 60 UMOL/L (ref 11–60)
ANION GAP SERPL CALC-SCNC: 10 MEQ/L (ref 8–16)
ANISOCYTOSIS BLD QL SMEAR: PRESENT
AST SERPL-CCNC: 162 U/L (ref 5–40)
BACTERIA URNS QL MICRO: ABNORMAL /HPF
BASOPHILS ABSOLUTE: 0 THOU/MM3 (ref 0–0.1)
BASOPHILS NFR BLD AUTO: 0.6 %
BILIRUB CONJ SERPL-MCNC: 2.1 MG/DL (ref 0.1–13.8)
BILIRUB SERPL-MCNC: 2.9 MG/DL (ref 0.3–1.2)
BILIRUB UR QL STRIP.AUTO: ABNORMAL
BUN SERPL-MCNC: 24 MG/DL (ref 7–22)
CALCIUM SERPL-MCNC: 8 MG/DL (ref 8.5–10.5)
CASTS #/AREA URNS LPF: ABNORMAL /LPF
CASTS 2: ABNORMAL /LPF
CHARACTER UR: CLEAR
CHLORIDE SERPL-SCNC: 103 MEQ/L (ref 98–111)
CO2 SERPL-SCNC: 19 MEQ/L (ref 23–33)
COLOR, UA: ABNORMAL
CREAT SERPL-MCNC: 0.8 MG/DL (ref 0.4–1.2)
CRYSTALS URNS MICRO: ABNORMAL
DEPRECATED RDW RBC AUTO: 74.6 FL (ref 35–45)
EOSINOPHIL NFR BLD AUTO: 3 %
EOSINOPHILS ABSOLUTE: 0.1 THOU/MM3 (ref 0–0.4)
EPITHELIAL CELLS, UA: ABNORMAL /HPF
ERYTHROCYTE [DISTWIDTH] IN BLOOD BY AUTOMATED COUNT: 20.4 % (ref 11.5–14.5)
FLUAV RNA RESP QL NAA+PROBE: NOT DETECTED
FLUBV RNA RESP QL NAA+PROBE: NOT DETECTED
GFR SERPL CREATININE-BSD FRML MDRD: > 90 ML/MIN/1.73M2
GLUCOSE SERPL-MCNC: 115 MG/DL (ref 70–108)
GLUCOSE UR QL STRIP.AUTO: NEGATIVE MG/DL
HCT VFR BLD AUTO: 34.2 % (ref 42–52)
HGB BLD-MCNC: 11.6 GM/DL (ref 14–18)
HGB UR QL STRIP.AUTO: ABNORMAL
IMM GRANULOCYTES # BLD AUTO: 0.04 THOU/MM3 (ref 0–0.07)
IMM GRANULOCYTES NFR BLD AUTO: 0.9 %
INR PPP: 1.24 (ref 0.85–1.13)
KETONES UR QL STRIP.AUTO: NEGATIVE
LACTIC ACID, SEPSIS: 1.4 MMOL/L (ref 0.5–1.9)
LACTIC ACID, SEPSIS: 1.8 MMOL/L (ref 0.5–1.9)
LIPASE SERPL-CCNC: 25.2 U/L (ref 5.6–51.3)
LYMPHOCYTES ABSOLUTE: 0.7 THOU/MM3 (ref 1–4.8)
LYMPHOCYTES NFR BLD AUTO: 14.7 %
MCH RBC QN AUTO: 33.9 PG (ref 26–33)
MCHC RBC AUTO-ENTMCNC: 33.9 GM/DL (ref 32.2–35.5)
MCV RBC AUTO: 100 FL (ref 80–94)
MISCELLANEOUS 2: ABNORMAL
MONOCYTES ABSOLUTE: 0.5 THOU/MM3 (ref 0.4–1.3)
MONOCYTES NFR BLD AUTO: 11.9 %
NEUTROPHILS ABSOLUTE: 3.2 THOU/MM3 (ref 1.8–7.7)
NEUTROPHILS NFR BLD AUTO: 68.9 %
NITRITE UR QL STRIP: NEGATIVE
NRBC BLD AUTO-RTO: 0 /100 WBC
OSMOLALITY SERPL CALC.SUM OF ELEC: 269.5 MOSMOL/KG (ref 275–300)
PH UR STRIP.AUTO: 6 [PH] (ref 5–9)
PLATELET # BLD AUTO: 136 THOU/MM3 (ref 130–400)
PLATELET BLD QL SMEAR: ADEQUATE
PMV BLD AUTO: 9.9 FL (ref 9.4–12.4)
POTASSIUM SERPL-SCNC: 4.1 MEQ/L (ref 3.5–5.2)
PROT SERPL-MCNC: 5.3 G/DL (ref 6.1–8)
PROT UR STRIP.AUTO-MCNC: 30 MG/DL
RBC # BLD AUTO: 3.42 MILL/MM3 (ref 4.7–6.1)
RBC URINE: ABNORMAL /HPF
RENAL EPI CELLS #/AREA URNS HPF: ABNORMAL /[HPF]
SARS-COV-2 RNA RESP QL NAA+PROBE: NOT DETECTED
SCAN OF BLOOD SMEAR: NORMAL
SODIUM SERPL-SCNC: 132 MEQ/L (ref 135–145)
SP GR UR REFRACT.AUTO: > 1.03 (ref 1–1.03)
UROBILINOGEN, URINE: 1 EU/DL (ref 0–1)
WBC # BLD AUTO: 4.6 THOU/MM3 (ref 4.8–10.8)
WBC #/AREA URNS HPF: ABNORMAL /HPF
WBC #/AREA URNS HPF: NEGATIVE /[HPF]
YEAST LIKE FUNGI URNS QL MICRO: ABNORMAL

## 2024-10-16 PROCEDURE — 83605 ASSAY OF LACTIC ACID: CPT

## 2024-10-16 PROCEDURE — 85025 COMPLETE CBC W/AUTO DIFF WBC: CPT

## 2024-10-16 PROCEDURE — 81001 URINALYSIS AUTO W/SCOPE: CPT

## 2024-10-16 PROCEDURE — 71046 X-RAY EXAM CHEST 2 VIEWS: CPT

## 2024-10-16 PROCEDURE — 6360000004 HC RX CONTRAST MEDICATION

## 2024-10-16 PROCEDURE — 80076 HEPATIC FUNCTION PANEL: CPT

## 2024-10-16 PROCEDURE — 74177 CT ABD & PELVIS W/CONTRAST: CPT

## 2024-10-16 PROCEDURE — 80048 BASIC METABOLIC PNL TOTAL CA: CPT

## 2024-10-16 PROCEDURE — 83690 ASSAY OF LIPASE: CPT

## 2024-10-16 PROCEDURE — 36415 COLL VENOUS BLD VENIPUNCTURE: CPT

## 2024-10-16 PROCEDURE — 82140 ASSAY OF AMMONIA: CPT

## 2024-10-16 PROCEDURE — 99285 EMERGENCY DEPT VISIT HI MDM: CPT

## 2024-10-16 PROCEDURE — 85610 PROTHROMBIN TIME: CPT

## 2024-10-16 PROCEDURE — 87636 SARSCOV2 & INF A&B AMP PRB: CPT

## 2024-10-16 PROCEDURE — 70450 CT HEAD/BRAIN W/O DYE: CPT

## 2024-10-16 RX ORDER — IOPAMIDOL 755 MG/ML
80 INJECTION, SOLUTION INTRAVASCULAR
Status: COMPLETED | OUTPATIENT
Start: 2024-10-16 | End: 2024-10-16

## 2024-10-16 RX ADMIN — IOPAMIDOL 80 ML: 755 INJECTION, SOLUTION INTRAVENOUS at 16:09

## 2024-10-16 ASSESSMENT — PAIN - FUNCTIONAL ASSESSMENT: PAIN_FUNCTIONAL_ASSESSMENT: NONE - DENIES PAIN

## 2024-10-16 NOTE — ED NOTES
Presents to ED with family complaints of worsening UTI. Family states pt was diagnosed with a UTI four days ago and began taking an ATB two days ago. Wife states pt has liver cancer and is no longer receiving chemo and radiation. Wife states pt has had no energy and is worried he may be septic. Upon arrival pt alert and oriented, denies pain.

## 2024-10-16 NOTE — ED PROVIDER NOTES
MD Carlos       PROCEDURES: (None if blank)  Procedures:     CRITICAL CARE:  None    MEDICATION CHANGES     Discharge Medication List as of 10/16/2024  7:10 PM          FINAL DISPOSITION   Shared Decision-Making was performed, disposition discussed with the patient/family and questions answered.    Outpatient follow up (If applicable):  Elisabeth Valle MD  [address]  [address]  [place] [address]  [phone]    Schedule an appointment as soon as possible for a visit       [facility] EMERGENCY DEPT  [address] [place] [address]  [phone]  Go to   If symptoms worsen    The results of pertinent diagnostic studies and exam findings were discussed with the patient/surrogate. The patient’s provisional diagnosis and plan of care were discussed with the patient and present family who expressed understanding. Medications were reviewed and indications and risks of medications were discussed with the patient/family present. Strict return precautions and follow up instructions were discussed with the patient prior to discharge, with which the patient agrees.      FINAL DIAGNOSES:  Final diagnoses:   Other fatigue   Urinary tract infection with hematuria, site unspecified   Lightheadedness   Decreased appetite       Condition: condition: stable  Dispo: Discharge to home  DISPOSITION Decision To Discharge 10/16/2024 07:01:40 PM    This transcription was electronically signed. It was dictated by use of voice recognition software and electronically transcribed. The transcription may contain errors not detected in proofreading.

## 2024-10-16 NOTE — PROGRESS NOTES
Name: Dann Turner  : 1950  MRN: [id number]    Oncology Navigation Follow-Up Note    Contact Type:  Medical Oncology    Subjective: appt with ONC    Objective: Pt in wheelchair at visit; family in attendance.  Lenvatinib stopped d/t toxicities.  +Hoarseness.    Oncology Plan of Care:    -Disease discussion  -Re-imaging to determine next steps  -MRI ABD  -CT Chest  -Add OT, nursing aide,  skilled nursing to PT services with HHC-->[facility] .  Printed copies of exercises for family to assist when therapist not visiting.  -Hospital bed with rails  -IVF hydration prn  -Return after scans for review & to determine POC    Referrals: HHC services to be expanded    Plan of Care Reviewed / Education:  yes    Assistance Needed: yes; family support, but in need of additional [facility] services & DME    Receptive to Advanced Care Planning / Palliative Care:  yes appropriate    Notes: Marcelo following to assist & support as needed.      Marcelo showed ONC U/A results from 10/10; ONC requested urine culture; Marcelo call to lab, spoke with Melissa.  Connected to micro & was informed pt's UA results did not meet lab criteria for running the culture, so it was not completed & cannot be done since the specimen is not available.  ONC updated.    RX Bactrim x1 week ordered-->sent to [facility] for family .      Marcelo call to [facility]:  Marcelo discussed DME needs: hosp bed with rails, bedside commode, & C services that need added to existing services, to include:  OT, aide, skilled nursing.  Patrica informs [facility] agency is \"[facility]\"->coordinated through [facility].    Marceol call to pt's Dann langston, &  Able to update on the above.        Electronically signed by Mitra Ram RN on 10/16/2024 at 1:33 PM

## 2024-10-16 NOTE — PROGRESS NOTES
Marcelo received tearful call from pt's spouse.   Marcelo return call to spouse; voicemail box is full.  Marcelo call to Dann, son- he shares his mother shared concerns with his wife & she was able to calm her.  Dann shared:  Brother Je's number:  [phone] so Marcelo could reach pt's spouse to return the call.    Marcelo call to Je's phone.  Marcelo inquiry how pt was doing, & he shared he \"isn't as perky as he was a few days ago\".    Bactrim AB was started yesterday for UTI.    Marcelo advised son to take pt to ER for evaluation to r/o sepsis.      EMR Update to ONC re: the above.

## 2024-10-16 NOTE — DISCHARGE INSTRUCTIONS
You were seen in the ED for generalized fatigue and lightheadedness in the setting of a urinary tract infection.  Labs and imaging reassuring while in the ED.  Please continue to take Bactrim as prescribed.  Antibiotics need 2-3 days to take full effect.  Continue to take all other medications as prescribed and indicated.  Follow-up with PCP as soon as possible for further evaluation and management. Return to the ED for any new or worsening symptoms, or any additional concerns.

## 2024-10-17 ENCOUNTER — HOSPITAL ENCOUNTER (OUTPATIENT)
Dept: INFUSION THERAPY | Age: 74
Discharge: HOME | End: 2024-10-17
Payer: OTHER GOVERNMENT

## 2024-10-17 VITALS
TEMPERATURE: 97.9 F | DIASTOLIC BLOOD PRESSURE: 59 MMHG | SYSTOLIC BLOOD PRESSURE: 106 MMHG | HEART RATE: 68 BPM | OXYGEN SATURATION: 93 % | RESPIRATION RATE: 16 BRPM

## 2024-10-17 DIAGNOSIS — C22.0 HEPATOCELLULAR CARCINOMA IN ADULT (HCC): ICD-10-CM

## 2024-10-17 DIAGNOSIS — E86.0 DEHYDRATION: Primary | ICD-10-CM

## 2024-10-17 PROCEDURE — 96360 HYDRATION IV INFUSION INIT: CPT

## 2024-10-17 PROCEDURE — 2580000003 HC RX 258: Performed by: INTERNAL MEDICINE

## 2024-10-17 PROCEDURE — 96361 HYDRATE IV INFUSION ADD-ON: CPT

## 2024-10-17 RX ORDER — ONDANSETRON 2 MG/ML
8 INJECTION INTRAMUSCULAR; INTRAVENOUS
Status: CANCELLED | OUTPATIENT
Start: 2024-10-17

## 2024-10-17 RX ORDER — SODIUM CHLORIDE 9 MG/ML
5-250 INJECTION, SOLUTION INTRAVENOUS PRN
Status: CANCELLED | OUTPATIENT
Start: 2024-10-17

## 2024-10-17 RX ORDER — SODIUM CHLORIDE 9 MG/ML
INJECTION, SOLUTION INTRAVENOUS CONTINUOUS
Status: CANCELLED | OUTPATIENT
Start: 2024-10-17

## 2024-10-17 RX ORDER — EPINEPHRINE 1 MG/ML
0.3 INJECTION, SOLUTION INTRAMUSCULAR; SUBCUTANEOUS PRN
Status: CANCELLED | OUTPATIENT
Start: 2024-10-17

## 2024-10-17 RX ORDER — ALBUTEROL SULFATE 90 UG/1
4 INHALANT RESPIRATORY (INHALATION) PRN
Status: CANCELLED | OUTPATIENT
Start: 2024-10-17

## 2024-10-17 RX ORDER — HEPARIN 100 UNIT/ML
500 SYRINGE INTRAVENOUS PRN
Status: CANCELLED | OUTPATIENT
Start: 2024-10-17

## 2024-10-17 RX ORDER — SODIUM CHLORIDE 0.9 % (FLUSH) 0.9 %
5-40 SYRINGE (ML) INJECTION PRN
Status: CANCELLED | OUTPATIENT
Start: 2024-10-17

## 2024-10-17 RX ORDER — ACETAMINOPHEN 325 MG/1
650 TABLET ORAL
Status: CANCELLED | OUTPATIENT
Start: 2024-10-17

## 2024-10-17 RX ORDER — DIPHENHYDRAMINE HYDROCHLORIDE 50 MG/ML
50 INJECTION INTRAMUSCULAR; INTRAVENOUS
Status: CANCELLED | OUTPATIENT
Start: 2024-10-17

## 2024-10-17 RX ADMIN — SODIUM CHLORIDE: 9 INJECTION, SOLUTION INTRAVENOUS at 10:28

## 2024-10-17 NOTE — PLAN OF CARE
Problem: Safety - Adult  Goal: Free from fall injury  Outcome: Adequate for Discharge  Flowsheets (Taken 10/17/2024 1230)  Free From Fall Injury: Instruct family/caregiver on patient safety     Problem: Discharge Planning  Goal: Discharge to home or other facility with appropriate resources  Outcome: Adequate for Discharge  Flowsheets (Taken 10/17/2024 1230)  Discharge to home or other facility with appropriate resources: Identify barriers to discharge with patient and caregiver     Problem: Chronic Conditions and Co-morbidities  Goal: Patient's chronic conditions and co-morbidity symptoms are monitored and maintained or improved  Outcome: Adequate for Discharge  Flowsheets (Taken 10/17/2024 1230)  Care Plan - Patient's Chronic Conditions and Co-Morbidity Symptoms are Monitored and Maintained or Improved:   Monitor and assess patient's chronic conditions and comorbid symptoms for stability, deterioration, or improvement   Collaborate with multidisciplinary team to address chronic and comorbid conditions and prevent exacerbation or deterioration  Note: Patient verbalizes understanding to verbal information given on IV hydration,action and possible side effects. Aware to call MD if develop complications.      Care plan reviewed with patient. Patient verbalizes understanding of the plan of care and contributes to goal setting.

## 2024-10-17 NOTE — PROGRESS NOTES
Patient tolerated IV hydration without any complications.  Denies dizziness, lightheadedness, acute nausea or vomiting, headache, heart palpitations, rash/itching or increased SOB.    Last vital signs  BP (!) 106/59   Pulse 68   Temp 97.9 °F (36.6 °C) (Oral)   Resp 16   SpO2 93%     Patient instructed if they experience any of the above symptoms following today's visit, he/she is to notify the Physician or go to the Emergency Dept.    Discharge instructions given to patient, Verbalizes understanding. Ambulated off unit per self in stable condition with all belongings.

## 2024-10-18 ENCOUNTER — CLINICAL DOCUMENTATION (OUTPATIENT)
Dept: CASE MANAGEMENT | Age: 74
End: 2024-10-18

## 2024-10-18 NOTE — PROGRESS NOTES
Marcelo received call from Patrica- Nurse NATH at [facility], updating that spouse has reported, pt is declining at home; fell again yesterday.  [facility] will be [facility] hand[facility] for [facility], ramp for his home, & the bed has been delivered.     Patrica also shares pt was approved for 18 hrs/week nurses aide services through [employer]; OT was added to PT services with HHC, & [facility] [employer] was also being ordered.    Marcelo query about VA approved ECF if pt's status warranted; Patrica reports this can be identified with consideration of pt's home address, if/when it is needed.      [facility] with the [facility].

## 2024-10-22 ENCOUNTER — CLINICAL DOCUMENTATION (OUTPATIENT)
Dept: CASE MANAGEMENT | Age: 74
End: 2024-10-22

## 2024-10-22 ENCOUNTER — OFFICE VISIT (OUTPATIENT)
Dept: ONCOLOGY | Age: 74
End: 2024-10-22
Payer: OTHER GOVERNMENT

## 2024-10-22 ENCOUNTER — HOSPITAL ENCOUNTER (OUTPATIENT)
Dept: INFUSION THERAPY | Age: 74
Discharge: HOME | End: 2024-10-22
Payer: OTHER GOVERNMENT

## 2024-10-22 VITALS
RESPIRATION RATE: 18 BRPM | DIASTOLIC BLOOD PRESSURE: 56 MMHG | SYSTOLIC BLOOD PRESSURE: 100 MMHG | TEMPERATURE: 98.4 F | OXYGEN SATURATION: 91 % | HEART RATE: 66 BPM

## 2024-10-22 DIAGNOSIS — C22.0 HEPATOCELLULAR CARCINOMA IN ADULT (HCC): Primary | ICD-10-CM

## 2024-10-22 PROCEDURE — 1123F ACP DISCUSS/DSCN MKR DOCD: CPT | Performed by: INTERNAL MEDICINE

## 2024-10-22 PROCEDURE — 99211 OFF/OP EST MAY X REQ PHY/QHP: CPT

## 2024-10-22 PROCEDURE — 99214 OFFICE O/P EST MOD 30 MIN: CPT | Performed by: INTERNAL MEDICINE

## 2024-10-22 NOTE — PATIENT INSTRUCTIONS
-Please schedule a virtual visit on Wednesday, 10/30/2024 at 2:30 PM.  -Patient to finalize decision on treatment versus hospice.

## 2024-10-22 NOTE — PROGRESS NOTES
appropriate, normal insight     Imaging Studies and Labs:   CBC:   Lab Results   Component Value Date    WBC 4.6 (L) 10/16/2024    HGB 11.6 (L) 10/16/2024    HCT 34.2 (L) 10/16/2024    .0 (H) 10/16/2024     10/16/2024     BMP:   Lab Results   Component Value Date/Time     10/16/2024 03:16 PM    K 4.1 10/16/2024 03:16 PM     10/16/2024 03:16 PM    CO2 19 10/16/2024 03:16 PM    BUN 24 10/16/2024 03:16 PM    CREATININE 0.8 10/16/2024 03:16 PM    GLUCOSE 115 10/16/2024 03:16 PM    GLUCOSE 102 12/04/2020 08:02 AM    CALCIUM 8.0 10/16/2024 03:16 PM      LFT:   Lab Results   Component Value Date    ALT 70 (H) 10/16/2024     (H) 10/16/2024    ALKPHOS 613 (H) 10/16/2024    BILITOT 2.9 (H) 10/16/2024      Assessment & Plan   Assessment and Plan:     #Hepatocellular carcinoma  Multiple lesions, unresectable     -Patient with progressive hepatocellular carcinoma.   -S/P 3 months on the lenvatinib.  He was tolerating this reasonably well with no cardiovascular or GI effects.  He was having some fatigue and some hoarseness these have not worsened.    -He underwent went MRI of the abdomen with and without contrast on August 16, 2024 which showed a mixed response.  With 2 lesions are improving in segment 8 and segment 4A, enlarged nodule in segment 3.      9/5/24: Seen and evaluated by Dr. Ryan.  -Patient apparently has poor performance status.  He is in the bed most of the time. He is unsteady.  Has frequent falling down.  He looks weak and chronically ill.  I have asked him to discontinue lenvatinib at this time to see if holding lenvatinib can improve his performance status.  -We did to continue with lenvatinib and consider local application there is a large nodule in segment 3.  However his performance status at this time is very low.  -Unfortunately his poor performance status may not make him eligible for any aggressive therapy.  Further lines of treatment is available including

## 2024-10-23 ENCOUNTER — HOSPITAL ENCOUNTER (EMERGENCY)
Age: 74
Discharge: HOME | End: 2024-10-23
Attending: EMERGENCY MEDICINE
Payer: OTHER GOVERNMENT

## 2024-10-23 ENCOUNTER — OFFICE VISIT (OUTPATIENT)
Dept: PALLATIVE CARE | Age: 74
End: 2024-10-23

## 2024-10-23 VITALS
RESPIRATION RATE: 20 BRPM | SYSTOLIC BLOOD PRESSURE: 99 MMHG | DIASTOLIC BLOOD PRESSURE: 65 MMHG | WEIGHT: 185 LBS | BODY MASS INDEX: 27.4 KG/M2 | HEART RATE: 74 BPM | OXYGEN SATURATION: 94 % | TEMPERATURE: 97.5 F | HEIGHT: 69 IN

## 2024-10-23 VITALS
HEIGHT: 69 IN | BODY MASS INDEX: 28.06 KG/M2 | OXYGEN SATURATION: 93 % | DIASTOLIC BLOOD PRESSURE: 58 MMHG | HEART RATE: 77 BPM | SYSTOLIC BLOOD PRESSURE: 92 MMHG | RESPIRATION RATE: 17 BRPM

## 2024-10-23 DIAGNOSIS — Z51.5 PALLIATIVE CARE PATIENT: ICD-10-CM

## 2024-10-23 DIAGNOSIS — T14.8XXA MULTIPLE SKIN TEARS: ICD-10-CM

## 2024-10-23 DIAGNOSIS — C22.0 HEPATOCELLULAR CARCINOMA IN ADULT (HCC): Primary | ICD-10-CM

## 2024-10-23 DIAGNOSIS — T07.XXXA MULTIPLE CONTUSIONS: ICD-10-CM

## 2024-10-23 DIAGNOSIS — S01.81XA FACIAL LACERATION, INITIAL ENCOUNTER: ICD-10-CM

## 2024-10-23 DIAGNOSIS — R53.81 PHYSICAL DECONDITIONING: ICD-10-CM

## 2024-10-23 DIAGNOSIS — S09.90XA CLOSED HEAD INJURY, INITIAL ENCOUNTER: Primary | ICD-10-CM

## 2024-10-23 DIAGNOSIS — K74.60 HEPATIC CIRRHOSIS, UNSPECIFIED HEPATIC CIRRHOSIS TYPE, UNSPECIFIED WHETHER ASCITES PRESENT (HCC): ICD-10-CM

## 2024-10-23 PROCEDURE — 99282 EMERGENCY DEPT VISIT SF MDM: CPT

## 2024-10-23 RX ORDER — NEOMYCIN/BACITRACIN/POLYMYXINB 3.5-400-5K
OINTMENT (GRAM) TOPICAL ONCE
Status: DISCONTINUED | OUTPATIENT
Start: 2024-10-23 | End: 2024-10-23 | Stop reason: HOSPADM

## 2024-10-23 ASSESSMENT — PAIN - FUNCTIONAL ASSESSMENT: PAIN_FUNCTIONAL_ASSESSMENT: NONE - DENIES PAIN

## 2024-10-23 NOTE — DISCHARGE INSTRUCTIONS
Continue home medications of oxycodone.  You may also take your Tylenol for pain.  Use ice to sore swollen areas.  Use antibiotic ointment twice a day to prevent infection from your eye area as well as to the elbows.  Follow-up with primary care or palliative care to discuss further plan home care, hospice etc.

## 2024-10-23 NOTE — ED NOTES
AVS printed and reviewed.  Patient taken to private vehicle by his home wheelchair with all belongings.

## 2024-10-23 NOTE — ED PROVIDER NOTES
treatment.  Provide basic wound care at this time.  I would not intervene as he is in palliative care.  Do not feel imaging of the cranium would be helpful especially in light of intracranial bleeding or other nonoperable treatment.  He is already prescribed oxycodone.  Recommend supportive care      PROCEDURES:   none    Procedures     FINAL IMPRESSION      1. Closed head injury, initial encounter    2. Facial laceration, initial encounter    3. Multiple contusions    4. Multiple skin tears    5. Palliative care patient          DISPOSITION/PLAN     DISPOSITION Decision To Discharge 10/23/2024 07:01:56 PM           PATIENT REFERRED TO:  Elisabeth Valle MD  [address] [place] [address]  [phone]    Call   Follow up from ER condition      DISCHARGE MEDICATIONS:  Discharge Medication List as of 10/23/2024  7:03 PM          (Please note that portions of this note were completed with a voice recognition program.  Efforts were made to edit the dictations but occasionally words are mis-transcribed.)    Vitor Hayes MD (electronically signed)  Attending Emergency Physician                       Vitor Hayes MD  10/23/24 1937

## 2024-10-23 NOTE — PROGRESS NOTES
Dann Turner is a 73 y.o. male who presents to the palliative care clinic today for:  Chief Complaint   Patient presents with    Follow-up     Patient declining, increased pain abdominal area, SOB with exertion, weaker, increased confusion, bilateral leg swelling         HPI:   Dann Turner presents to the palliative care clinic today For follow-up of symptoms related to his cancer.  Since his last appointment in the outpatient palliative care clinic, he is continue to have a decline in his functional status.  He is also having worsening symptoms.  He met with his oncologist on October 22nd 2024.  His oncologist have recommended only continuing with immunotherapy as his performance status has declined to the point where systemic chemotherapy is no longer an option or transitioning to comfort care.    Symptoms:  Pain:   They deny pain.  Current Treatments- They have Tylenol 1000 mg twice a day for scheduled pain relief. From 8 AM yesterday to 8 AM today, they have used Oxycodone IR 5 mg maybe once for as needed pain relief.  Effectiveness of Current Treatment- Their pain medications are working well to control their pain.  Shortness of breath: Patient denies shortness of breath or difficulty breathing. Patient denies cough.  Sleep: Patient reports trouble sleeping. Family reports trouble sleeping.  Fatigue/Drowsiness: Patient reports fatigue and drowsiness.   Appetite: Patient reports poor appetite. They do not think food tastes good. They just do not feel hungry. This has been a problem for month.  Wt. Loss: Patient's weight is stable.   Dry Mouth: Patient reports dry mouth.  Nausea/Vomiting: Patient denies nausea and vomiting.  Constipation/Diarrhea: Patient reports diarrhea.  Depression: Patient reports depression and/or issues with mood. They are currently taking Zoloft for their depression. It is not working well for them.  Anxiety: Patient reports anxiety.        Current Outpatient Medications

## 2024-10-23 NOTE — PATIENT INSTRUCTIONS
Continue current plan of care for acute and chronic conditions per primary and specialist teams  Continue Oxycodone IR 5 mg every 6 hours as needed for breakthrough cancer related pain and shortness of breath  Let us know if we can be any assistance with helping and making a decision about whether or not to continue with immunotherapy  Please call the office if your symptoms worsen or if you were to develop new symptoms  Please call the palliative care office when you need refills

## 2024-10-24 ENCOUNTER — CLINICAL DOCUMENTATION (OUTPATIENT)
Dept: CASE MANAGEMENT | Age: 74
End: 2024-10-24

## 2024-10-24 NOTE — PROGRESS NOTES
Name: Dann Turner  : 1950  MRN: [id number]    Oncology Navigation Follow-Up Note    Contact Type:  Medical Oncology    Subjective: appt with ONC    Objective: Po Intake diminished; not getting up.  [facility].  Hosp bed & supplies were delivered.  DIL inquires if HHC can do in home IVF Hydration.     Oncology Plan of Care:    -ONC reviewed recent imaging reflective of disease progression.  -ONC discussed option of immunotherapy every 3 wks-->pt requesting to \"give it some thought & discuss with family\".  -ONC discussed pt's low performance score, which creates challenges with tx.  -Would cont with scans every 3 months if opting for tx  -Cont FU with Palliative care for sx control  -Televisit in 1 week to define how pt wishes to proceed-->ONC to call sonDann at:  [phone].    [facility]errals: [facility] for [facility]F hydration at home    Plan of Care Reviewed / Education:  yes    Assistance Needed: family support; cont HHC services.    Receptive to Advanced Care Planning / Palliative Care:  following    Notes: Close Navigation support to pt & family.    Electronically signed by Mitra Ram RN on 10/24/2024 at 9:18 AM

## 2024-10-24 NOTE — PROGRESS NOTES
Call placed to [facility], to speak with Nurse Patrica NATH.    Message left to return call.      Family inquires:  \"Would  the [facility] approve home IVF Hydration be added to pt's HHC services, that have been [facility]\".  Taking pt to OP Onc 2x per week is difficult for the patient & family.  Pt's family report an improvement in pt's status with the IVF hydration.    Return call from PORTILLO Burciaga at [facility].  The above discussed.    She informs IVF hydration is not something that would be covered, as pt has the ability to eat & drink.      Return call to TEGAN Kwan message left re: the above.

## 2024-10-26 ENCOUNTER — APPOINTMENT (OUTPATIENT)
Dept: CT IMAGING | Age: 74
End: 2024-10-26
Payer: OTHER GOVERNMENT

## 2024-10-26 ENCOUNTER — APPOINTMENT (OUTPATIENT)
Dept: GENERAL RADIOLOGY | Age: 74
End: 2024-10-26
Payer: OTHER GOVERNMENT

## 2024-10-26 ENCOUNTER — HOSPITAL ENCOUNTER (INPATIENT)
Age: 74
LOS: 1 days | End: 2024-10-27
Attending: EMERGENCY MEDICINE | Admitting: STUDENT IN AN ORGANIZED HEALTH CARE EDUCATION/TRAINING PROGRAM
Payer: OTHER GOVERNMENT

## 2024-10-26 DIAGNOSIS — R79.89 ELEVATED LFTS: ICD-10-CM

## 2024-10-26 DIAGNOSIS — N17.9 ACUTE KIDNEY INJURY (HCC): Primary | ICD-10-CM

## 2024-10-26 DIAGNOSIS — Z85.05 HISTORY OF LIVER CANCER: ICD-10-CM

## 2024-10-26 DIAGNOSIS — R18.0 MALIGNANT ASCITES: ICD-10-CM

## 2024-10-26 DIAGNOSIS — J90 PLEURAL EFFUSION: ICD-10-CM

## 2024-10-26 PROBLEM — G93.40 ACUTE ENCEPHALOPATHY: Status: ACTIVE | Noted: 2024-10-26

## 2024-10-26 LAB
ALBUMIN SERPL BCP-MCNC: 1.8 GM/DL (ref 3.4–5)
ALP SERPL-CCNC: 723 U/L (ref 46–116)
ALT SERPL W P-5'-P-CCNC: 135 U/L (ref 14–63)
AMMONIA PLAS-MCNC: 80 UMOL/L (ref 11–60)
ANION GAP SERPL CALC-SCNC: 15 MEQ/L (ref 8–16)
APTT: 32.6 SECONDS (ref 22–38)
AST SERPL W P-5'-P-CCNC: 415 U/L (ref 15–37)
BASOPHILS # BLD: 0.2 % (ref 0–3)
BASOPHILS ABSOLUTE: 0 THOU/MM3 (ref 0–0.1)
BILIRUB SERPL-MCNC: 5.8 MG/DL (ref 0.2–1)
BUN SERPL-MCNC: 69 MG/DL (ref 7–18)
CALCIUM SERPL-MCNC: 8.7 MG/DL (ref 8.5–10.1)
CHLORIDE SERPL-SCNC: 103 MEQ/L (ref 98–107)
CO2 SERPL-SCNC: 18 MEQ/L (ref 21–32)
CREAT SERPL-MCNC: 3.5 MG/DL (ref 0.6–1.3)
EKG Q-T INTERVAL: 402 MS
EKG QRS DURATION: 88 MS
EKG QTC CALCULATION (BAZETT): 486 MS
EKG R AXIS: -39 DEGREES
EKG T AXIS: -18 DEGREES
EKG VENTRICULAR RATE: 88 BPM
EOSINOPHILS ABSOLUTE: 0 THOU/MM3 (ref 0–0.5)
EOSINOPHILS RELATIVE PERCENT: 0.2 % (ref 0–4)
GFR SERPL CREATININE-BSD FRML MDRD: 17 ML/MIN/1.73M2
GLUCOSE BLD STRIP.AUTO-MCNC: 61 MG/DL (ref 70–108)
GLUCOSE SERPL-MCNC: 57 MG/DL (ref 74–106)
HCT VFR BLD CALC: 30.8 % (ref 42–52)
HEMOGLOBIN: 10.8 GM/DL (ref 14–18)
IMMATURE GRANS (ABS): 0.12 THOU/MM3 (ref 0–0.07)
IMMATURE GRANULOCYTES %: 1 %
INR BLD: 1.62 (ref 0.85–1.13)
LACTATE: 5.2 MMOL/L (ref 0.9–1.7)
LYMPHOCYTES # BLD AUTO: 6.4 % (ref 15–47)
LYMPHOCYTES ABSOLUTE: 0.7 THOU/MM3 (ref 1–4.8)
MAGNESIUM SERPL-MCNC: 2.4 MG/DL (ref 1.8–2.4)
MCH RBC QN AUTO: 33.9 PG (ref 26–32)
MCHC RBC AUTO-ENTMCNC: 35.1 GM/DL (ref 31–35)
MCV RBC AUTO: 96.6 FL (ref 80–94)
MONOCYTES: 0.9 THOU/MM3 (ref 0.3–1.3)
MONOCYTES: 7.6 % (ref 0–12)
NEUTROPHILS ABSOLUTE: 9.8 THOU/MM3 (ref 1.8–7.7)
NT PRO BNP: 3328 PG/ML (ref 0–900)
PDW BLD-RTO: 20.5 % (ref 11.5–14.9)
PLATELET # BLD AUTO: 145 THOU/MM3 (ref 130–400)
PMV BLD AUTO: 9.8 FL (ref 9.4–12.4)
POTASSIUM SERPL-SCNC: 5.2 MEQ/L (ref 3.5–5.1)
PROT SERPL-MCNC: 5.4 GM/DL (ref 6.4–8.2)
RBC # BLD: 3.19 MILL/MM3 (ref 4.5–6.1)
SEG NEUTROPHILS: 84.9 % (ref 43–75)
SODIUM SERPL-SCNC: 136 MEQ/L (ref 136–145)
TROPONIN, HIGH SENSITIVITY: 37 PG/ML (ref 0–76.1)
WBC # BLD: 11.5 THOU/MM3 (ref 4.8–10.8)

## 2024-10-26 PROCEDURE — 85610 PROTHROMBIN TIME: CPT

## 2024-10-26 PROCEDURE — 71045 X-RAY EXAM CHEST 1 VIEW: CPT

## 2024-10-26 PROCEDURE — 1200000000 HC SEMI PRIVATE

## 2024-10-26 PROCEDURE — 83605 ASSAY OF LACTIC ACID: CPT

## 2024-10-26 PROCEDURE — 70450 CT HEAD/BRAIN W/O DYE: CPT

## 2024-10-26 PROCEDURE — 2580000003 HC RX 258: Performed by: NURSE PRACTITIONER

## 2024-10-26 PROCEDURE — 71250 CT THORAX DX C-: CPT

## 2024-10-26 PROCEDURE — 80053 COMPREHEN METABOLIC PANEL: CPT

## 2024-10-26 PROCEDURE — 82948 REAGENT STRIP/BLOOD GLUCOSE: CPT

## 2024-10-26 PROCEDURE — 83880 ASSAY OF NATRIURETIC PEPTIDE: CPT

## 2024-10-26 PROCEDURE — 96374 THER/PROPH/DIAG INJ IV PUSH: CPT

## 2024-10-26 PROCEDURE — 99223 1ST HOSP IP/OBS HIGH 75: CPT | Performed by: NURSE PRACTITIONER

## 2024-10-26 PROCEDURE — 87040 BLOOD CULTURE FOR BACTERIA: CPT

## 2024-10-26 PROCEDURE — 84484 ASSAY OF TROPONIN QUANT: CPT

## 2024-10-26 PROCEDURE — 2580000003 HC RX 258: Performed by: EMERGENCY MEDICINE

## 2024-10-26 PROCEDURE — 85025 COMPLETE CBC W/AUTO DIFF WBC: CPT

## 2024-10-26 PROCEDURE — 93010 ELECTROCARDIOGRAM REPORT: CPT | Performed by: NUCLEAR MEDICINE

## 2024-10-26 PROCEDURE — 74176 CT ABD & PELVIS W/O CONTRAST: CPT

## 2024-10-26 PROCEDURE — 2580000003 HC RX 258

## 2024-10-26 PROCEDURE — 36415 COLL VENOUS BLD VENIPUNCTURE: CPT

## 2024-10-26 PROCEDURE — 82140 ASSAY OF AMMONIA: CPT

## 2024-10-26 PROCEDURE — 93005 ELECTROCARDIOGRAM TRACING: CPT

## 2024-10-26 PROCEDURE — 83735 ASSAY OF MAGNESIUM: CPT

## 2024-10-26 PROCEDURE — 85730 THROMBOPLASTIN TIME PARTIAL: CPT

## 2024-10-26 PROCEDURE — 6360000002 HC RX W HCPCS: Performed by: NURSE PRACTITIONER

## 2024-10-26 PROCEDURE — 6360000002 HC RX W HCPCS

## 2024-10-26 PROCEDURE — 96375 TX/PRO/DX INJ NEW DRUG ADDON: CPT

## 2024-10-26 PROCEDURE — 99285 EMERGENCY DEPT VISIT HI MDM: CPT

## 2024-10-26 RX ORDER — SODIUM CHLORIDE 9 MG/ML
INJECTION, SOLUTION INTRAVENOUS PRN
Status: DISCONTINUED | OUTPATIENT
Start: 2024-10-26 | End: 2024-10-26

## 2024-10-26 RX ORDER — 0.9 % SODIUM CHLORIDE 0.9 %
1000 INTRAVENOUS SOLUTION INTRAVENOUS ONCE
Status: COMPLETED | OUTPATIENT
Start: 2024-10-26 | End: 2024-10-26

## 2024-10-26 RX ORDER — ONDANSETRON 4 MG/1
4 TABLET, ORALLY DISINTEGRATING ORAL EVERY 8 HOURS PRN
Status: DISCONTINUED | OUTPATIENT
Start: 2024-10-26 | End: 2024-10-26

## 2024-10-26 RX ORDER — ACETAMINOPHEN 650 MG/1
650 SUPPOSITORY RECTAL EVERY 4 HOURS PRN
Status: DISCONTINUED | OUTPATIENT
Start: 2024-10-26 | End: 2024-10-27 | Stop reason: HOSPADM

## 2024-10-26 RX ORDER — ACETAMINOPHEN 325 MG/1
650 TABLET ORAL EVERY 6 HOURS PRN
Status: DISCONTINUED | OUTPATIENT
Start: 2024-10-26 | End: 2024-10-26

## 2024-10-26 RX ORDER — SODIUM CHLORIDE 0.9 % (FLUSH) 0.9 %
5-40 SYRINGE (ML) INJECTION EVERY 12 HOURS SCHEDULED
Status: DISCONTINUED | OUTPATIENT
Start: 2024-10-26 | End: 2024-10-27 | Stop reason: HOSPADM

## 2024-10-26 RX ORDER — ONDANSETRON 2 MG/ML
4 INJECTION INTRAMUSCULAR; INTRAVENOUS ONCE
Status: COMPLETED | OUTPATIENT
Start: 2024-10-26 | End: 2024-10-26

## 2024-10-26 RX ORDER — ENOXAPARIN SODIUM 100 MG/ML
30 INJECTION SUBCUTANEOUS DAILY
Status: DISCONTINUED | OUTPATIENT
Start: 2024-10-27 | End: 2024-10-26

## 2024-10-26 RX ORDER — SODIUM CHLORIDE 9 MG/ML
INJECTION, SOLUTION INTRAVENOUS CONTINUOUS
Status: DISCONTINUED | OUTPATIENT
Start: 2024-10-26 | End: 2024-10-26

## 2024-10-26 RX ORDER — GLYCOPYRROLATE 0.2 MG/ML
0.2 INJECTION INTRAMUSCULAR; INTRAVENOUS EVERY 4 HOURS PRN
Status: DISCONTINUED | OUTPATIENT
Start: 2024-10-26 | End: 2024-10-27 | Stop reason: HOSPADM

## 2024-10-26 RX ORDER — SODIUM CHLORIDE 0.9 % (FLUSH) 0.9 %
5-40 SYRINGE (ML) INJECTION PRN
Status: DISCONTINUED | OUTPATIENT
Start: 2024-10-26 | End: 2024-10-27 | Stop reason: HOSPADM

## 2024-10-26 RX ORDER — ONDANSETRON 2 MG/ML
4 INJECTION INTRAMUSCULAR; INTRAVENOUS EVERY 6 HOURS PRN
Status: DISCONTINUED | OUTPATIENT
Start: 2024-10-26 | End: 2024-10-27 | Stop reason: HOSPADM

## 2024-10-26 RX ORDER — MORPHINE SULFATE 4 MG/ML
4 INJECTION, SOLUTION INTRAMUSCULAR; INTRAVENOUS
Status: DISCONTINUED | OUTPATIENT
Start: 2024-10-26 | End: 2024-10-27

## 2024-10-26 RX ORDER — ONDANSETRON 2 MG/ML
4 INJECTION INTRAMUSCULAR; INTRAVENOUS EVERY 6 HOURS PRN
Status: DISCONTINUED | OUTPATIENT
Start: 2024-10-26 | End: 2024-10-26 | Stop reason: SDUPTHER

## 2024-10-26 RX ORDER — ACETAMINOPHEN 650 MG/1
650 SUPPOSITORY RECTAL EVERY 6 HOURS PRN
Status: DISCONTINUED | OUTPATIENT
Start: 2024-10-26 | End: 2024-10-26

## 2024-10-26 RX ORDER — MORPHINE SULFATE 2 MG/ML
2 INJECTION, SOLUTION INTRAMUSCULAR; INTRAVENOUS
Status: DISCONTINUED | OUTPATIENT
Start: 2024-10-26 | End: 2024-10-27

## 2024-10-26 RX ORDER — LORAZEPAM 2 MG/ML
1 INJECTION INTRAMUSCULAR EVERY 6 HOURS PRN
Status: DISCONTINUED | OUTPATIENT
Start: 2024-10-26 | End: 2024-10-27 | Stop reason: HOSPADM

## 2024-10-26 RX ORDER — POLYETHYLENE GLYCOL 3350 17 G/17G
17 POWDER, FOR SOLUTION ORAL DAILY PRN
Status: DISCONTINUED | OUTPATIENT
Start: 2024-10-26 | End: 2024-10-26

## 2024-10-26 RX ADMIN — HYDROMORPHONE HYDROCHLORIDE 1 MG: 1 INJECTION, SOLUTION INTRAMUSCULAR; INTRAVENOUS; SUBCUTANEOUS at 15:38

## 2024-10-26 RX ADMIN — VANCOMYCIN HYDROCHLORIDE 1500 MG: 1 INJECTION, POWDER, LYOPHILIZED, FOR SOLUTION INTRAVENOUS at 17:51

## 2024-10-26 RX ADMIN — SODIUM CHLORIDE, PRESERVATIVE FREE 10 ML: 5 INJECTION INTRAVENOUS at 22:23

## 2024-10-26 RX ADMIN — MORPHINE SULFATE 4 MG: 4 INJECTION, SOLUTION INTRAMUSCULAR; INTRAVENOUS at 22:22

## 2024-10-26 RX ADMIN — ONDANSETRON 4 MG: 2 INJECTION INTRAMUSCULAR; INTRAVENOUS at 15:38

## 2024-10-26 RX ADMIN — MORPHINE SULFATE 4 MG: 4 INJECTION, SOLUTION INTRAMUSCULAR; INTRAVENOUS at 20:03

## 2024-10-26 RX ADMIN — LORAZEPAM 1 MG: 2 INJECTION INTRAMUSCULAR; INTRAVENOUS at 20:58

## 2024-10-26 RX ADMIN — SODIUM CHLORIDE 1000 ML: 9 INJECTION, SOLUTION INTRAVENOUS at 15:37

## 2024-10-26 RX ADMIN — WATER 2000 MG: 1 INJECTION INTRAMUSCULAR; INTRAVENOUS; SUBCUTANEOUS at 17:46

## 2024-10-26 RX ADMIN — SODIUM CHLORIDE: 9 INJECTION, SOLUTION INTRAVENOUS at 17:59

## 2024-10-26 ASSESSMENT — PAIN SCALES - GENERAL: PAINLEVEL_OUTOF10: 10

## 2024-10-26 ASSESSMENT — PAIN - FUNCTIONAL ASSESSMENT: PAIN_FUNCTIONAL_ASSESSMENT: 0-10

## 2024-10-26 NOTE — ED NOTES
DNR CC band applied to pt wrist. Pt resting comfortable in cot. Warm blanket applied. Family updated on plan of care. Lights dimmed. Door shut per request of family.

## 2024-10-26 NOTE — ED NOTES
EMS arrival for patient in room trauma 2. Report given to EMS team. No questions at this time.  Pt resting calm and breathing with ease with non re breather 10L breathing with ease. Wife has no questions at his time patient is aware of plan of care. Patient loaded onto cot and released with EMS for admission. Wife and son will follow.

## 2024-10-26 NOTE — ED TRIAGE NOTES
Pt arrival to ER with family assisted to wheelchair and escorted to trauma 2. Pt rolling eyes in back of his head. Shaking, is pulling at his left arm. Wife states he is unable to eat drink and refusing medications. Rapid decline in last twenty four hours per wife. Son at bedside. Oxygen applied. EKG vitals as documented. INT placed. Left arm wrapped from recent fall per wife. Pt knod yes when asked if in pain. Pt on palliative care per son. Lives at home. Liver cancer. Pt is belly breathing rapidly at this time. Oxygen applied.

## 2024-10-26 NOTE — H&P
Hospitalist  History and Physical    Patient:  Dann Turner  MRN: [id number]    CHIEF COMPLAINT: Unable to eat and drink and refusing medications, decline in health status over the past 24 hours, confusion    History Obtained From:  patient, electronic medical record  PCP: Elisabeth Valle MD    HISTORY OF PRESENT ILLNESS:   Dann Turner is a 73-year-old male presented to [facility] 10/26/2024 as a transfer from [facility] with complaint of inability to eat and drink and refusing medications, decline in health status over the past 24 hours, confusion.  Patient arrived with family SaO2 87% on room air which required escalating oxygen requirement to 100% NRB Mask.     Patient was recently evaluated at [facility] 10/23/2024 secondary to fall and left arm abrasion.  He landed on his buttocks.  Denied any syncope or near syncope complaints.  Documentation identifies basic wound care was given.  Did not feel imaging was needed.    Past Medical History:        Diagnosis Date    Cancer (HCC)     liver    Hyperlipidemia     Hypertension    Former smoker  Hepatocellular carcinoma  Hyponatremia  Elevated liver enzymes  Anemia macrocytic  GERD  Dyslipidemia    Wife and family are present at the bedside and contributing to history pieces.  Patient is nonresponsive currently on 100% nonrebreather mask.   Patient was seen in oncology clinic 10/14/2024 and 10/22/24 CT imaging revealed disease progression. Immunotherapy was reviewed however this treatment is only for palliative intent and side effects can be detrimental.  Discussion regarding possible transition to hospice was going to be further discussed with other family members.  Supportive IV hydration was completed via the infusion center. Documentation identifies that patient is physically dependent for all ADLs with complaint of fatigue intermittent hallucinations weakness and diminished appetite/oral intake.    Wife identifies that he has not been able to eat or drink anything for  Thyroid: Supple, no masses, nodes, nodules or enlargement.  Neck: Trachea is midline, positive for bilateral neck vein distention Lungs: Audible crackles throughout  Heart: regular rate and rhythm, S1, S2 normal, no murmur, click, rub or gallop.  Positive for anasarca  Abdomen: Positive for semiformed, rare bowel sounds,  Extremities: Positive for anasarca, bilateral 3+ pitting edema hips down  Neurologic: Mental status: Lethargic, nonresponsive, no withdrawal from painful stimuli.    CBC:   Recent Labs     10/26/24  1536   WBC 11.5*   HGB 10.8*        BMP:    Recent Labs     10/26/24  1536      K 5.2*      CO2 18*   BUN 69*   CREATININE 3.5*   GLUCOSE 57*     Hepatic:   Recent Labs     10/26/24  1536   *   *   BILITOT 5.8*   ALKPHOS 723*     Troponin: No results for input(s): \"TROPONINI\" in the last 72 hours.  BNP: No results for input(s): \"BNP\" in the last 72 hours.  Lipids: No results for input(s): \"CHOL\", \"HDL\" in the last 72 hours.    Invalid input(s): \"LDLCALCU\"  ABGs: No results found for: \"PHART\", \"PO2ART\", \"JEZ1QAC\"  INR:   Recent Labs     10/26/24  1544   INR 1.62*     -----------------------------------------------------------------  PA/lat CXR: 10/26/2024.  Poor inflation of the lungs.  Borderline heart size.  Moderate interstitial edema/pneumonia involving both lungs relatively diffusely.  No effusion seen.  EKG: 10/26/2024 normal sinus rhythm heart rate 88    CTH 10/26/24 no acute intracranial process    CT Chest 10/26/24 moderate amount of ascites.  Small effusion left side.  Moderate interstitial pneumonia/edema involving both lungs relatively diffusely.  Moderate size hiatal hernia.    CT A/P 10/26/24 the liver is relatively small in size and is displaced medially by a moderate amount of ascites.  Unchanged from prior study.  Noted metastatic lesions previously seen in the liver on the contrast-enhanced study.  Gallbladder is unremarkable.  Pancreas is small and

## 2024-10-26 NOTE — ED PROVIDER NOTES
I performed a history and physical examination of the patient and discussed management with the resident. I reviewed the resident’s note and agree with the documented findings and plan of care. Any areas of disagreement are noted on the chart. I was personally present for the key portions of any procedures. I have documented in the chart those procedures where I was not present during the key portions. I have reviewed the emergency nurses triage note. I agree with the chief complaint, past medical history, past surgical history, allergies, medications, social and family history as documented unless otherwise noted below. Documentation of the HPI, Physical Exam and Medical Decision Making performed by medical students or scribes is based on my personal performance of the HPI, PE and MDM. For Phys Assistant/ Nurse Practitioner cases/documentation I have personally evaluated this patient and have completed at least one if not all key elements of the E/M (history, physical exam, and MDM). My findings are as noted below.    In other words, I personally saw and examined the patient I have reviewed and agreed with the resident findings including all diagnostic interpretations and treatment plans as written.  I was present for the key portion of any procedures performed and the inclusive time noted in any critical care statement.    Patient presents today for fatigue lethargy and pain this is 73-year-old male who has hepatocellular carcinoma.  He did not tolerate chemotherapy was looking into immunotherapy is on palliative care coming in today for not acting right all day long, patient has not been communicating well with the family.  Apparently he has been moaning and groaning most of the day.  They waited till this afternoon to bring him in for evaluation and treatment.  Here today the patient is slightly hypoxic at bedside he is slightly hypotensive.  Wife at bedside states that he has not been eating or drinking very  well.  She also states that he has not been taking his medications as prescribed.  Patient is not very communicative here.  Physical examination reveals extreme tenderness on the abdomen with guarding.  Lungs show decreased breath sounds bilaterally in the bases.  Patient does have slight jaundice looked home.  He does have liver cirrhosis associated with this.  He used to be a heavy drinker.  Patient also is displaying lower extremity swelling.  I had a long talk with the wife at bedside.  She is unsure whether he wants to go to hospice or not she knows this is very real situation.  She understands the process.  Patient is already on palliative care.  Other than the vague complaints given to us by the family at bedside.  Patient is appearing slightly encephalopathic.  He does have pain reaction.  No other physical complaints provided by patient's family at this time    EKG reveals: Read as an accelerated junctional rhythm however I do see P waves in front of the QRSs, it appears to be sinus rhythm, there might be some variability in PE origin.  Ventricular rate of 88 CT interval estimated at 160 QRS 88 QT interval 402 QTc of 486      CT HEAD WO CONTRAST   Final Result   No acute intracranial process..         **This report has been created using voice recognition software.  It may contain   minor errors which are inherent in voice recognition technology.**      Electronically signed by Dr. Herbert Singh      XR CHEST PORTABLE   Final Result   1. Very poor inflation of the lungs. Borderline heart size.   2. Moderate interstitial pneumonia/edema involving both lungs relatively   diffusely. No effusion seen.            **This report has been created using voice recognition software.  It may contain   minor errors which are inherent in voice recognition technology.**      Electronically signed by Dr. Herbert Singh      CT CHEST WO CONTRAST    (Results Pending)   CT ABDOMEN PELVIS WO CONTRAST Additional Contrast? None

## 2024-10-26 NOTE — ED PROVIDER NOTES
[facility] [place]  EMERGENCY DEPARTMENT ENCOUNTER          Pt Name: Dann Turner  MRN: [id number]  Birthdate 1950  Date of evaluation: 10/26/2024  Physician: Luisa Pritchett MD  Supervising Attending Physician: Sunny Mason DO       CHIEF COMPLAINT       Chief Complaint   Patient presents with    Fatigue    Lethargic          HISTORY OF PRESENT ILLNESS    HPI  Dann Turner is a 73 y.o. male past medical history of hepatocellular carcinoma and cirrhosis who presents to the emergency department from home for evaluation of change in mental status onset yesterday.  Per the patient's wife the patient has not been taking his medications since yesterday and has progressively appeared to be in more pain.  She relates that the patient has not been answering questions and has just been nodding yes or no.  Per wife the patient has been rolling around and acting as if he was in pain at home.  He has also had poor oral intake.  Wife relates that the patient had a recent fall a couple days ago.  She states that he is on palliative care.  He is no longer receiving chemotherapy.  Patient's history is limited secondary to altered mental status.    The patient has no other acute complaints at this time.      PAST MEDICAL AND SURGICAL HISTORY     Past Medical History:   Diagnosis Date    Cancer (HCC)     liver    Hyperlipidemia     Hypertension      Past Surgical History:   Procedure Laterality Date    CT NEEDLE BIOPSY LIVER PERCUTANEOUS  5/31/2024    CT NEEDLE BIOPSY LIVER PERCUTANEOUS 5/31/2024 [facility] CT SCAN    NERVE SURGERY      pinched nerve in left arm    OTHER SURGICAL HISTORY  02/02/2024    \"chemo embolization\" of liver tumor         MEDICATIONS     Current Facility-Administered Medications:     ondansetron (ZOFRAN) injection 4 mg, 4 mg, IntraVENous, Once, Luisa Pritchett MD    HYDROmorphone (DILAUDID) injection 1 mg, 1 mg, IntraVENous, Once, Luisa Pritchett MD       HENT:      Head: Normocephalic.      Comments: Ecchymosis noted around the left orbit     Nose: Nose normal.      Mouth/Throat:      Mouth: Mucous membranes are dry.      Pharynx: Oropharynx is clear.   Eyes:      General:         Right eye: No discharge.         Left eye: No discharge.      Conjunctiva/sclera: Conjunctivae normal.   Cardiovascular:      Rate and Rhythm: Normal rate and regular rhythm.      Heart sounds: Normal heart sounds.   Pulmonary:      Effort: Tachypnea present.      Breath sounds: Examination of the right-lower field reveals decreased breath sounds. Examination of the left-lower field reveals decreased breath sounds. Decreased breath sounds present. No wheezing.   Abdominal:      General: There is distension.      Tenderness: There is abdominal tenderness.   Musculoskeletal:      Comments: Left upper extremity is wrapped in gauze bandage.   Skin:     General: Skin is warm and dry.      Capillary Refill: Capillary refill takes 2 to 3 seconds.   Neurological:      General: No focal deficit present.      Mental Status: He is disoriented.         ED RESULTS   Laboratory results (none if blank):  Labs Reviewed   CULTURE, BLOOD 1   CULTURE, BLOOD 1   CBC WITH AUTO DIFFERENTIAL   COMPREHENSIVE METABOLIC PANEL   TROPONIN   MAGNESIUM   LACTATE, SEPSIS   AMMONIA   APTT   PROTIME-INR   URINALYSIS WITH REFLEX TO CULTURE     All laboratory results are individually reviewed and interpreted by me in the clinical context of this patient.  See ED course below for results interpretation if applicable.  (A negative COVID-19 test should be interpreted as COVID no longer suspected unless otherwise noted in this encounter documentation note)  (Any cultures that may have been sent were not resulted at the time of this patient ED visit)      Radiologic studies results available at the moment of this note (None if blank):  XR CHEST PORTABLE    (Results Pending)   CT ABDOMEN PELVIS W IV CONTRAST Additional

## 2024-10-27 VITALS
RESPIRATION RATE: 14 BRPM | OXYGEN SATURATION: 85 % | HEART RATE: 90 BPM | DIASTOLIC BLOOD PRESSURE: 38 MMHG | SYSTOLIC BLOOD PRESSURE: 52 MMHG | TEMPERATURE: 96.8 F

## 2024-10-27 PROBLEM — N17.9 ACUTE KIDNEY INJURY (HCC): Status: ACTIVE | Noted: 2024-10-27

## 2024-10-27 PROBLEM — N17.1 SEPSIS WITH ACUTE RENAL FAILURE AND RENAL CORTICAL NECROSIS (HCC): Status: ACTIVE | Noted: 2024-10-27

## 2024-10-27 PROBLEM — J96.01 ACUTE RESPIRATORY FAILURE WITH HYPOXIA: Status: ACTIVE | Noted: 2024-10-27

## 2024-10-27 PROBLEM — R65.20 SEPSIS WITH ORGAN DYSFUNCTION (HCC): Status: ACTIVE | Noted: 2024-10-27

## 2024-10-27 PROBLEM — A41.9 SEPSIS WITH ORGAN DYSFUNCTION (HCC): Status: ACTIVE | Noted: 2024-10-27

## 2024-10-27 PROBLEM — K74.60 DECOMPENSATION OF CIRRHOSIS OF LIVER (HCC): Status: ACTIVE | Noted: 2024-10-27

## 2024-10-27 PROBLEM — E87.5 ACUTE HYPERKALEMIA: Status: ACTIVE | Noted: 2024-10-27

## 2024-10-27 PROBLEM — A41.9 SEPSIS WITH ACUTE RENAL FAILURE AND RENAL CORTICAL NECROSIS (HCC): Status: ACTIVE | Noted: 2024-10-27

## 2024-10-27 PROBLEM — R65.20 SEPSIS WITH ACUTE RENAL FAILURE AND RENAL CORTICAL NECROSIS (HCC): Status: ACTIVE | Noted: 2024-10-27

## 2024-10-27 PROBLEM — D53.9 ANEMIA, MACROCYTIC: Status: ACTIVE | Noted: 2024-10-27

## 2024-10-27 PROBLEM — K72.90 DECOMPENSATION OF CIRRHOSIS OF LIVER (HCC): Status: ACTIVE | Noted: 2024-10-27

## 2024-10-27 PROCEDURE — 99239 HOSP IP/OBS DSCHRG MGMT >30: CPT | Performed by: STUDENT IN AN ORGANIZED HEALTH CARE EDUCATION/TRAINING PROGRAM

## 2024-10-27 PROCEDURE — 51702 INSERT TEMP BLADDER CATH: CPT

## 2024-10-27 PROCEDURE — 6360000002 HC RX W HCPCS: Performed by: NURSE PRACTITIONER

## 2024-10-27 PROCEDURE — 2580000003 HC RX 258: Performed by: NURSE PRACTITIONER

## 2024-10-27 PROCEDURE — 51798 US URINE CAPACITY MEASURE: CPT

## 2024-10-27 RX ORDER — MORPHINE SULFATE 2 MG/ML
2 INJECTION, SOLUTION INTRAMUSCULAR; INTRAVENOUS
Status: DISCONTINUED | OUTPATIENT
Start: 2024-10-27 | End: 2024-10-27 | Stop reason: HOSPADM

## 2024-10-27 RX ORDER — MORPHINE SULFATE 4 MG/ML
4 INJECTION, SOLUTION INTRAMUSCULAR; INTRAVENOUS
Status: DISCONTINUED | OUTPATIENT
Start: 2024-10-27 | End: 2024-10-27 | Stop reason: HOSPADM

## 2024-10-27 RX ADMIN — LORAZEPAM 1 MG: 2 INJECTION INTRAMUSCULAR; INTRAVENOUS at 10:52

## 2024-10-27 RX ADMIN — MORPHINE SULFATE 4 MG: 4 INJECTION, SOLUTION INTRAMUSCULAR; INTRAVENOUS at 09:32

## 2024-10-27 RX ADMIN — MORPHINE SULFATE 4 MG: 4 INJECTION, SOLUTION INTRAMUSCULAR; INTRAVENOUS at 02:42

## 2024-10-27 RX ADMIN — GLYCOPYRROLATE 0.2 MG: 0.2 INJECTION INTRAMUSCULAR; INTRAVENOUS at 05:44

## 2024-10-27 RX ADMIN — GLYCOPYRROLATE 0.2 MG: 0.2 INJECTION INTRAMUSCULAR; INTRAVENOUS at 02:42

## 2024-10-27 RX ADMIN — SODIUM CHLORIDE, PRESERVATIVE FREE 10 ML: 5 INJECTION INTRAVENOUS at 09:34

## 2024-10-27 RX ADMIN — MORPHINE SULFATE 4 MG: 4 INJECTION, SOLUTION INTRAMUSCULAR; INTRAVENOUS at 05:44

## 2024-10-27 ASSESSMENT — PAIN SCALES - GENERAL: PAINLEVEL_OUTOF10: 2

## 2024-10-27 NOTE — PLAN OF CARE
Problem: Safety - Adult  Goal: Free from fall injury  10/27/2024 1041 by Trish Back RN  Outcome: Progressing  Flowsheets (Taken 10/27/2024 1041)  Free From Fall Injury:   Instruct family/caregiver on patient safety   Based on caregiver fall risk screen, instruct family/caregiver to ask for assistance with transferring infant if caregiver noted to have fall risk factors     Problem: Pain  Goal: Verbalizes/displays adequate comfort level or baseline comfort level  10/27/2024 1041 by Trish Back, RN  Outcome: Progressing  Flowsheets (Taken 10/27/2024 1041)  Verbalizes/displays adequate comfort level or baseline comfort level:   Assess pain using appropriate pain scale   Encourage patient to monitor pain and request assistance   Administer analgesics based on type and severity of pain and evaluate response   Implement non-pharmacological measures as appropriate and evaluate response     Problem: Skin/Tissue Integrity  Goal: Absence of new skin breakdown  Description: 1.  Monitor for areas of redness and/or skin breakdown  2.  Assess vascular access sites hourly  3.  Every 4-6 hours minimum:  Change oxygen saturation probe site  4.  Every 4-6 hours:  If on nasal continuous positive airway pressure, respiratory therapy assess nares and determine need for appliance change or resting period.  10/27/2024 1041 by Trish Back RN  Outcome: Progressing     Problem: Confusion  Goal: Confusion, delirium, dementia, or psychosis is improved or at baseline  Description: INTERVENTIONS:  1. Assess for possible contributors to thought disturbance, including medications, impaired vision or hearing, underlying metabolic abnormalities, dehydration, psychiatric diagnoses, and notify attending LIP  2. [place] high risk fall precautions, as indicated  3. Provide frequent short contacts to provide reality reorientation, refocusing and direction  4. Decrease environmental stimuli, including noise as appropriate  5. Monitor and  intervene to maintain adequate nutrition, hydration, elimination, sleep and activity  6. If unable to ensure safety without constant attention obtain sitter and review sitter guidelines with assigned personnel  7. Initiate Psychosocial CNS and Spiritual Care consult, as indicated  10/27/2024 1041 by Trish Back, RN  Outcome: Progressing  Flowsheets (Taken 10/27/2024 1041)  Effect of thought disturbance (confusion, delirium, dementia, or psychosis) are managed with adequate functional status:   Assess for contributors to thought disturbance, including medications, impaired vision or hearing, underlying metabolic abnormalities, dehydration, psychiatric diagnoses, notify LIP   [place] high risk fall precautions, as indicated   Provide frequent short contacts to provide reality reorientation, refocusing and direction     Problem: Dyspnea Due to End of Life  Goal: Demonstrate understanding of and ability to manage respiratory symptoms at end of life  Description: Patient  and or family/caregiver will verbalize recall of breathing strategies to maintain an effective breathing pattern during the inpatient hospice stay.        10/27/2024 1041 by Trish Back, RN  Outcome: Progressing   Care plan reviewed with family.  Family verbalize understanding of the plan of care and contribute to goal setting.

## 2024-10-27 NOTE — DISCHARGE SUMMARY
Hospital Medicine Death Note      Patient Identification:   Dann Turner   : 1950  MRN: [id number]   Account: [id number]      Patient's PCP: Elisabeth Valle MD    Admit Date: 10/26/2024   Death date:   10/27/2024  Time of death: 1126    Admitting Physician: No admitting provider for patient encounter.     Discharge Physician: Uziel Hidalgo DO     Discharge Diagnoses:  Acute hypoxic respiratory failure  Progressive hepatocellular carcinoma: Multiple lesions appreciated, unresectable and s/p 3 months of Lenvatinib.  Poor performance and failure to thrive makes him ineligible for aggressive therapy.  Immunotherapy considered but risk versus benefits opted deferment due to detrimental side effects.  Imaging revealed progression of disease, with CT A/P showing abdominal ascites, mesenteric edema and omental thickening/infiltration, mass in dome of right hepatic lobe stable, liver lobulated contour 10/16/2024.  MRI abdomen showed multifocal metastatic disease in the liver and interval progression with a 1.5 cm nodule within the pancreatic body.  Extensive family discussion resulted in CODE STATUS changed to DNR CC, hospice consulted.  Continue with comfort care measures.  Acute on chronic decompensated liver cirrhosis    Hospital Course:   The patient is a 73-year-old male with a PMH of hepatocellular carcinoma, HTN, HLD, BPH, and OSMAN who presented to [facility] as transfer from OSH for failure to thrive and confusion.  Per report the patient has had an overall decline in health, noting an inability to eat and drink, refusing medications and a progressive decline with worsening confusion over the past 24 hours.  Per chart review, he was seen at oncology clinic 10/14 and then 10/22 where CT imaging revealed progression of disease.  Immunotherapy was considered for treatment however only deemed with palliative intent, and discussion regarding its detrimental side effects versus benefits.  There was talks about  transition to hospice pending family discussions.  Upon arrival, SaO2 was noted to be 87% on RA, necessitating escalation to 100% NRB.  He is relatively unresponsive.  CT head was negative, CT chest showed moderate amount of ascites, small left effusion, moderate interstitial PNA portion edema bilaterally relatively diffuse.  A CT A/P noted metastatic lesions previously seen in the liver, mild gas distention and few small bowels in the abdomen centrally consistent with a paralytic ileus, no free air, moderate amount of ascites.  After extensive discussion with wife and family present at bedside CODE STATUS was changed to DNR CC, hospice was consulted and comfort care measures were initiated.  The patient admitted for further management and evaluation.  The patient was evaluated morning of 10/27 and noted in respiratory distress with accessory muscle use.  He was lethargic and somnolent.  Comfort care measures were adjusted.  Family was updated at bedside.  Shortly afterwards notified that the patient had  at 1126 on 10/27/2024.      Disposition:    Condition at Discharge: Terminal    Code Status:  DNR-CC         Signed:    Thank you Elisabeth Valle MD for the opportunity to be involved in this patient's care.    Electronically signed by Uziel Hidalgo DO on 10/27/2024 at 11:49 AM

## 2024-10-27 NOTE — CARE COORDINATION
10/27/24 1126   Service Assessment   Patient Orientation Unable to Assess   Cognition Other (see comment)  (KEITH)   History Provided By Medical Record   Primary Caregiver Spouse   Support Systems Family Members;Spouse/Significant Other   Patient's Healthcare Decision Maker is: Named in Scanned ACP Document   PCP Verified by CM Yes   Prior Functional Level Assistance with the following:;Other (see comment)  (KEITH)   Current Functional Level Assistance with the following:;Bathing;Dressing;Toileting;Feeding;Cooking;Housework;Shopping;Mobility   Can patient return to prior living arrangement Unknown at present   Ability to make needs known: Poor   Family able to assist with home care needs: Yes   Would you like for me to discuss the discharge plan with any other family members/significant others, and if so, who? No  (wife present but sleeping)   Financial Resources [place]   Community Resources Other (Comment)  (uncertain, KEITH)   Discharge Planning   Type of Residence House   Living Arrangements Spouse/Significant Other   Current Services Prior To Admission None   Potential Assistance Needed Home Care;Other (Comment)  (hospice consult pending.)   DME Ordered? No   Potential Assistance Purchasing Medications No   Type of Home Care Services None   Patient expects to be discharged to: Hospice (comment)  (awaiting hospice referral)   Condition of Participation: Discharge Planning   The Plan for Transition of Care is related to the following treatment goals: hospice eval     Patient Goals/Plan/Treatment Preferences: Dann and his wife are both sleeping at this time. Per documentation, wife is no longer able to provide for all Dann's needs and has requested hospice consult. Will follow up as appropriate.    If patient is discharged prior to next notation, then this note serves as note for discharge by case management.

## 2024-10-27 NOTE — PROGRESS NOTES
Patient transported to the [facility]. Family took all patient belongings.

## 2024-10-27 NOTE — PLAN OF CARE
Problem: Safety - Adult  Goal: Free from fall injury  Outcome: Progressing   All fall precautions in place. Bed in low position, alarm activated and appropriate use of call light.     Problem: Pain  Goal: Verbalizes/displays adequate comfort level or baseline comfort level  Outcome: Progressing   Pain Assessment: 0-10  Pain Level: 10       Is pain goal met at this time?  Yes          Problem: Skin/Tissue Integrity  Goal: Absence of new skin breakdown  Description: 1.  Monitor for areas of redness and/or skin breakdown  2.  Assess vascular access sites hourly  3.  Every 4-6 hours minimum:  Change oxygen saturation probe site  4.  Every 4-6 hours:  If on nasal continuous positive airway pressure, respiratory therapy assess nares and determine need for appliance change or resting period.  Outcome: Progressing        Problem: Dyspnea Due to End of Life  Goal: Demonstrate understanding of and ability to manage respiratory symptoms at end of life  Description: Patient  and or family/caregiver will verbalize recall of breathing strategies to maintain an effective breathing pattern during the inpatient hospice stay.        Outcome: Progressing     Care plan reviewed with patient's family.  Patient's family verbalizes understanding of the plan of care and contributes to goal setting.

## 2024-10-27 NOTE — PROGRESS NOTES
Spiritual Care at time of death: I met with family and provided spiritual and grief care to family through presence and prayer. There were 5 family members.  More family members are on their way to the hospital. I will check back with family later this afternoon to continue supportive presence.

## 2024-10-27 NOTE — PROGRESS NOTES
Hospitalist Progress Note    Patient:  Dann Turner      Unit/Bed:5K-15/015-A    YOB: 1950    MRN: [id number]       Acct: [id number]     PCP: Elisabeth Valle MD    Date of Admission: 10/26/2024    Assessment/Plan:    Acute hypoxic respiratory failure: In the setting of progressive and decompensated hepatocellular carcinoma.  Presented SpO2 87%, escalated to NRB.  Continue titrate for comfort.  Comfort care measures initiated  Progressive hepatocellular carcinoma: Multiple lesions appreciated, unresectable and s/p 3 months of Lenvatinib.  Poor performance and failure to thrive makes him ineligible for aggressive therapy.  Immunotherapy considered but risk versus benefits opted deferment due to detrimental side effects.  Imaging revealed progression of disease, with CT A/P showing abdominal ascites, mesenteric edema and omental thickening/infiltration, mass in dome of right hepatic lobe stable, liver lobulated contour 10/16/2024.  MRI abdomen showed multifocal metastatic disease in the liver and interval progression with a 1.5 cm nodule within the pancreatic body.  Extensive family discussion resulted in CODE STATUS changed to DNR CC, hospice consulted.  Continue with comfort care measures.  Acute on chronic decompensated liver cirrhosis: Noted      Expected discharge date:  TBD    Disposition:    [] Home       [] TCU       [] Rehab       [] Psych       [] SNF       [] Long Term Care Facility       [] Other-    Chief Complaint: Failure to thrive and confusion    Hospital Course:   The patient is a 73-year-old male with a PMH of hepatocellular carcinoma, HTN, HLD, BPH, and OSMAN who presented to [facility] as transfer from OSH for failure to thrive and confusion.  Per report the patient has had an overall decline in health, noting an inability to eat and drink, refusing medications and a progressive decline with worsening confusion over the past 24 hours.  Per chart review, he was seen at oncology

## 2024-10-27 NOTE — PROGRESS NOTES
Patient absent of vitals and heart sounds. Time of death 1126. This RN verified with Milagros QUINTERO. Provider Gwen Triplett notified. House Supervisor Maria Luisa QUINTERO notified. Spiritual care called and RN left message.     1510- family left bedside and post mortem cares completed

## 2024-10-27 NOTE — DEATH NOTES
[facility]  Notice of Patient Passing      Patient Name- Dann Turner   Acct Number- [id number]   Attending Physician- Uziel Hidalgo DO    Admitted on-10/26/2024  3:25 PM     On 10/27/2024 at 1126 patient was found in 5K15 with:   Absence of vital signs.   Absence of neurological response.    Confirmed time of death at 1126.   Physician or On-call Physician notified of time of death- yes    Family present at time of death- yes, Spouse   Spiritual care present at time of death- no    Physician was notified and orders were obtained to release the body.   Post-Mortem documentation completed; form printed, signed, and given to admitting.    Maria Luisa Lebron RN RN Nursing Supervisor/ Manager  10/27/24   1:57 PM    ________________________________________________________________________    Complete information below if 's Case only:    Death Notification    Reported __1209______________   [facility]’s Office  ’s Case ___No_______   Internal Use Only   Autopsy       Y ____    N_X___  ’s & Investigator’s Notes  Agency    ________________  FAX:  [phone]   Agency #   _______________     Call Date: 10/27/2024   Call Time: 1209  Notified by: Maria Luisa Lebron RN House Supervisor   Of: death    Type of Death:   [x] Notification  []Hospice  []Hospital < 24 Hour  [] ED Death  []Home  []Nursing Home    9203 released as a 's case

## 2024-10-31 LAB
BACTERIA BLD AEROBE CULT: NORMAL
BACTERIA BLD AEROBE CULT: NORMAL

## 2025-07-25 NOTE — ED NOTES
CARE TRANSITIONS     Attempted to contact patient for 2nd follow-up call in Care Transitions program. Left voicemail. Will attempt to contact at a later date/time.   DNR code status change paperwork signed by MD and wife at bedside. DNR Comfort Care signed. Handed to registration to be scanned in.